# Patient Record
Sex: FEMALE | URBAN - METROPOLITAN AREA
[De-identification: names, ages, dates, MRNs, and addresses within clinical notes are randomized per-mention and may not be internally consistent; named-entity substitution may affect disease eponyms.]

---

## 2018-09-22 PROCEDURE — 87624 HPV HI-RISK TYP POOLED RSLT: CPT | Performed by: OBSTETRICS & GYNECOLOGY

## 2018-09-22 PROCEDURE — 88175 CYTOPATH C/V AUTO FLUID REDO: CPT | Performed by: OBSTETRICS & GYNECOLOGY

## 2018-09-22 PROCEDURE — 87591 N.GONORRHOEAE DNA AMP PROB: CPT | Performed by: OBSTETRICS & GYNECOLOGY

## 2018-09-22 PROCEDURE — 87491 CHLMYD TRACH DNA AMP PROBE: CPT | Performed by: OBSTETRICS & GYNECOLOGY

## 2022-01-05 ENCOUNTER — APPOINTMENT (OUTPATIENT)
Dept: URBAN - METROPOLITAN AREA CLINIC 321 | Age: 32
Setting detail: DERMATOLOGY
End: 2022-01-05

## 2022-01-05 DIAGNOSIS — L21.8 OTHER SEBORRHEIC DERMATITIS: ICD-10-CM

## 2022-01-05 DIAGNOSIS — L71.8 OTHER ROSACEA: ICD-10-CM

## 2022-01-05 PROCEDURE — OTHER PRESCRIPTION: OTHER

## 2022-01-05 PROCEDURE — OTHER COUNSELING: OTHER

## 2022-01-05 PROCEDURE — 99204 OFFICE O/P NEW MOD 45 MIN: CPT

## 2022-01-05 RX ORDER — KETOCONAZOLE 20 MG/G
CREAM TOPICAL
Qty: 30 | Refills: 3 | Status: ERX | COMMUNITY
Start: 2022-01-05

## 2022-01-05 RX ORDER — METRONIDAZOLE 7.5 MG/G
CREAM TOPICAL
Qty: 45 | Refills: 3 | Status: ERX | COMMUNITY
Start: 2022-01-05

## 2022-10-26 ENCOUNTER — OFFICE VISIT (OUTPATIENT)
Dept: OBGYN CLINIC | Facility: CLINIC | Age: 32
End: 2022-10-26
Payer: MEDICAID

## 2022-10-26 ENCOUNTER — LAB ENCOUNTER (OUTPATIENT)
Dept: LAB | Facility: HOSPITAL | Age: 32
End: 2022-10-26
Attending: ADVANCED PRACTICE MIDWIFE
Payer: MEDICAID

## 2022-10-26 VITALS
HEART RATE: 97 BPM | BODY MASS INDEX: 30.36 KG/M2 | WEIGHT: 165 LBS | SYSTOLIC BLOOD PRESSURE: 132 MMHG | DIASTOLIC BLOOD PRESSURE: 80 MMHG | HEIGHT: 62 IN

## 2022-10-26 DIAGNOSIS — Z11.3 SCREEN FOR STD (SEXUALLY TRANSMITTED DISEASE): ICD-10-CM

## 2022-10-26 DIAGNOSIS — Z31.9 PATIENT DESIRES PREGNANCY: ICD-10-CM

## 2022-10-26 DIAGNOSIS — Z01.419 ENCOUNTER FOR ANNUAL ROUTINE GYNECOLOGICAL EXAMINATION: Primary | ICD-10-CM

## 2022-10-26 DIAGNOSIS — Z32.00 PREGNANCY EXAMINATION OR TEST, PREGNANCY UNCONFIRMED: ICD-10-CM

## 2022-10-26 DIAGNOSIS — Z30.432 ENCOUNTER FOR IUD REMOVAL: ICD-10-CM

## 2022-10-26 LAB
CONTROL LINE PRESENT WITH A CLEAR BACKGROUND (YES/NO): YES YES/NO
HBV SURFACE AG SER-ACNC: <0.1 [IU]/L
HBV SURFACE AG SERPL QL IA: NONREACTIVE
HCV AB SERPL QL IA: NONREACTIVE
PREGNANCY TEST, URINE: NEGATIVE
RUBV IGG SER QL: POSITIVE
RUBV IGG SER-ACNC: 105.3 IU/ML (ref 10–?)

## 2022-10-26 PROCEDURE — 3075F SYST BP GE 130 - 139MM HG: CPT | Performed by: ADVANCED PRACTICE MIDWIFE

## 2022-10-26 PROCEDURE — 81025 URINE PREGNANCY TEST: CPT | Performed by: ADVANCED PRACTICE MIDWIFE

## 2022-10-26 PROCEDURE — 86803 HEPATITIS C AB TEST: CPT

## 2022-10-26 PROCEDURE — 87389 HIV-1 AG W/HIV-1&-2 AB AG IA: CPT

## 2022-10-26 PROCEDURE — 99385 PREV VISIT NEW AGE 18-39: CPT | Performed by: ADVANCED PRACTICE MIDWIFE

## 2022-10-26 PROCEDURE — 58301 REMOVE INTRAUTERINE DEVICE: CPT | Performed by: ADVANCED PRACTICE MIDWIFE

## 2022-10-26 PROCEDURE — 87340 HEPATITIS B SURFACE AG IA: CPT

## 2022-10-26 PROCEDURE — 3079F DIAST BP 80-89 MM HG: CPT | Performed by: ADVANCED PRACTICE MIDWIFE

## 2022-10-26 PROCEDURE — 86780 TREPONEMA PALLIDUM: CPT

## 2022-10-26 PROCEDURE — 3008F BODY MASS INDEX DOCD: CPT | Performed by: ADVANCED PRACTICE MIDWIFE

## 2022-10-26 PROCEDURE — 36415 COLL VENOUS BLD VENIPUNCTURE: CPT

## 2022-10-26 PROCEDURE — 86762 RUBELLA ANTIBODY: CPT

## 2022-10-27 LAB
C TRACH DNA SPEC QL NAA+PROBE: NEGATIVE
HPV I/H RISK 1 DNA SPEC QL NAA+PROBE: NEGATIVE
N GONORRHOEA DNA SPEC QL NAA+PROBE: NEGATIVE

## 2022-10-27 NOTE — PROCEDURES
IUD Removal     Pregnancy Results: negative from urine test   Birth control method(s) used:   IUD    Consent signed. Procedure discussed with the patient in detail including indication, risks, benefits, alternatives and complications. Pelvic Exam Findings:  See progress notes    Procedure:  Speculum placed in the vagina. Ring clamp used to grasp IUD strings. IUD was removed without difficulty. IUD appears complete and intact  Visualized by patient  The patient tolerated the procedure well. Visit Plan:  Discharge instructions were reviewed with the patient.

## 2022-10-28 ENCOUNTER — TELEPHONE (OUTPATIENT)
Dept: OBGYN CLINIC | Facility: CLINIC | Age: 32
End: 2022-10-28

## 2022-10-28 LAB — T PALLIDUM AB SER QL: NEGATIVE

## 2022-10-28 NOTE — TELEPHONE ENCOUNTER
----- Message from Amol Begum CNM sent at 10/27/2022  2:24 PM CDT -----  Please call HIV, HepB & Hep C are negative

## 2022-11-08 RX ORDER — METRONIDAZOLE 500 MG/1
500 TABLET ORAL 2 TIMES DAILY
Qty: 14 TABLET | Refills: 0 | Status: SHIPPED | OUTPATIENT
Start: 2022-11-08

## 2023-12-04 ENCOUNTER — OFFICE VISIT (OUTPATIENT)
Dept: DERMATOLOGY CLINIC | Facility: CLINIC | Age: 33
End: 2023-12-04
Payer: MEDICAID

## 2023-12-04 DIAGNOSIS — L30.9 DERMATITIS: Primary | ICD-10-CM

## 2023-12-04 DIAGNOSIS — L71.9 ROSACEA: ICD-10-CM

## 2023-12-04 PROCEDURE — 99203 OFFICE O/P NEW LOW 30 MIN: CPT | Performed by: DERMATOLOGY

## 2023-12-04 RX ORDER — IVERMECTIN 10 MG/G
CREAM TOPICAL
Qty: 30 G | Refills: 1 | Status: SHIPPED | OUTPATIENT
Start: 2023-12-04

## 2023-12-04 RX ORDER — AMMONIUM LACTATE 12 G/100G
1 LOTION TOPICAL 2 TIMES DAILY
Qty: 500 G | Refills: 11 | Status: SHIPPED | OUTPATIENT
Start: 2023-12-04

## 2023-12-04 RX ORDER — HYDROCORTISONE 25 MG/ML
LOTION TOPICAL
Qty: 120 ML | Refills: 1 | Status: SHIPPED | OUTPATIENT
Start: 2023-12-04

## 2023-12-04 RX ORDER — CLINDAMYCIN PHOSPHATE 10 UG/ML
1 LOTION TOPICAL 2 TIMES DAILY
Qty: 60 ML | Refills: 11 | Status: SHIPPED | OUTPATIENT
Start: 2023-12-04

## 2023-12-06 ENCOUNTER — TELEPHONE (OUTPATIENT)
Dept: DERMATOLOGY CLINIC | Facility: CLINIC | Age: 33
End: 2023-12-06

## 2023-12-06 NOTE — TELEPHONE ENCOUNTER
[facility] not [facility].     Placed fax in pa inbox    Please call [phone] id [phone]

## 2023-12-08 ENCOUNTER — HOSPITAL ENCOUNTER (EMERGENCY)
Facility: HOSPITAL | Age: 33
Discharge: HOME | End: 2023-12-08
Attending: EMERGENCY MEDICINE
Payer: MEDICAID

## 2023-12-08 VITALS
OXYGEN SATURATION: 98 % | TEMPERATURE: 100 F | DIASTOLIC BLOOD PRESSURE: 84 MMHG | SYSTOLIC BLOOD PRESSURE: 126 MMHG | HEART RATE: 108 BPM | RESPIRATION RATE: 20 BRPM

## 2023-12-08 DIAGNOSIS — R04.0 EPISTAXIS: ICD-10-CM

## 2023-12-08 DIAGNOSIS — J11.1 INFLUENZA: Primary | ICD-10-CM

## 2023-12-08 LAB
B-HCG UR QL: NEGATIVE
FLUAV + FLUBV RNA SPEC NAA+PROBE: NEGATIVE
FLUAV + FLUBV RNA SPEC NAA+PROBE: POSITIVE
RSV RNA SPEC NAA+PROBE: NEGATIVE
SARS-COV-2 RNA RESP QL NAA+PROBE: NOT DETECTED

## 2023-12-08 PROCEDURE — 99283 EMERGENCY DEPT VISIT LOW MDM: CPT

## 2023-12-08 PROCEDURE — 0241U SARS-COV-2/FLU A AND B/RSV BY PCR (GENEXPERT): CPT | Performed by: EMERGENCY MEDICINE

## 2023-12-08 PROCEDURE — 81025 URINE PREGNANCY TEST: CPT

## 2023-12-08 PROCEDURE — 0241U SARS-COV-2/FLU A AND B/RSV BY PCR (GENEXPERT): CPT

## 2023-12-08 RX ORDER — IBUPROFEN 600 MG/1
600 TABLET ORAL ONCE
Status: COMPLETED | OUTPATIENT
Start: 2023-12-08 | End: 2023-12-08

## 2023-12-09 NOTE — ED QUICK NOTES
Discharge instructions given to pt. Pt verbalized understanding of home care, fever control, and to follow up with PCP. Pt denied further questions or concerns. Pt ambulatory out of ED with mom, pt discharged in stable condition.

## 2023-12-11 NOTE — PROGRESS NOTES
Kalyani Hall is a 35year old female. HPI:     CC:    Chief Complaint   Patient presents with    Rash     New Patient presents with an isolated rash of concern on the Left cheek x 2 years. Area flares at times. Cluster of small lesions filled with with clear fluid. Area is itchy, red, and flaky. Pt has been applying castor oil, vasyl hip oil, coconut oil, and OTC moisturizer without improvement. Pt states that after popping the lesions, the severity of inflammation/redness decreased. Derm Problem     Pt presents with raised, acne-like lesions on the buttocks x several weeks. Pt denies use of any treatments or any symptoms to concern. Allergies:  Dander    HISTORY:    History reviewed. No pertinent past medical history. Past Surgical History:   Procedure Laterality Date    ANESTH,OPEN HEART SURGERY      COLPOSCOPY,BX CERVIX/ENDOCERV CURR  10/2018      Family History   Problem Relation Age of Onset    Diabetes Father     Hypertension Father     Renal Disease Father     Other (bladder cancer) Maternal Grandmother       Social History     Socioeconomic History    Marital status: Single   Tobacco Use    Smoking status: Never    Smokeless tobacco: Never   Vaping Use    Vaping Use: Never used   Substance and Sexual Activity    Alcohol use: Yes    Drug use: No    Sexual activity: Yes     Partners: Male     Birth control/protection: I.U.D. Other Topics Concern    Reaction to local anesthetic No    Pt has a pacemaker No    Pt has a defibrillator No        Current Outpatient Medications   Medication Sig Dispense Refill    clindamycin 1 % External Lotion Apply 1 Application topically 2 (two) times daily. Apply thin film to affected area(s). Acne like rash face and back/ thighs 60 mL 11    Hydrocortisone 2.5 % External Lotion Apply every day to rash on thighs, back. Do not use on face 120 mL 1    ammonium lactate 12 % External Lotion Apply 1 Application topically 2 (two) times daily.  To dry bumpy skin arms, legs, back 500 g 11    Ivermectin (SOOLANTRA) 1 % External Cream Apply to left cheek qhs 30 g 1     Allergies: Allergies   Allergen Reactions    Dander OTHER (SEE COMMENTS)     Runny nose, stuffy nose, red/ watery eyes       History reviewed. No pertinent past medical history. Past Surgical History:   Procedure Laterality Date    ANESTH,OPEN HEART SURGERY      COLPOSCOPY,BX CERVIX/ENDOCERV CURR  10/2018     Social History     Socioeconomic History    Marital status: Single     Spouse name: Not on file    Number of children: Not on file    Years of education: Not on file    Highest education level: Not on file   Occupational History    Not on file   Tobacco Use    Smoking status: Never    Smokeless tobacco: Never   Vaping Use    Vaping Use: Never used   Substance and Sexual Activity    Alcohol use: Yes    Drug use: No    Sexual activity: Yes     Partners: Male     Birth control/protection: I.U.D. Other Topics Concern    Grew up on a farm Not Asked    History of tanning Not Asked    Outdoor occupation Not Asked    Breast feeding Not Asked    Reaction to local anesthetic No    Pt has a pacemaker No    Pt has a defibrillator No   Social History Narrative    Not on file     Social Determinants of Health     Financial Resource Strain: Not on file   Food Insecurity: Not on file   Transportation Needs: Not on file   Physical Activity: Not on file   Stress: Not on file   Social Connections: Not on file   Housing Stability: Not on file     Family History   Problem Relation Age of Onset    Diabetes Father     Hypertension Father     Renal Disease Father     Other (bladder cancer) Maternal Grandmother        There were no vitals filed for this visit. HPI:  Chief Complaint   Patient presents with    Rash     New Patient presents with an isolated rash of concern on the Left cheek x 2 years. Area flares at times. Cluster of small lesions filled with with clear fluid. Area is itchy, red, and flaky.  Pt has been applying castor oil, vasyl hip oil, coconut oil, and OTC moisturizer without improvement. Pt states that after popping the lesions, the severity of inflammation/redness decreased. Derm Problem     Pt presents with raised, acne-like lesions on the buttocks x several weeks. Pt denies use of any treatments or any symptoms to concern. Patient with erythema small bumpy lesions not actual acne has tried to pop them without success has applied a variety of things as noted different oils moisturizers. Inflamed irritated. Has noted this has been associated with increased rest with her son's Crohn's disease. Patient has noted itchy areas on the back arms buttocks area    Unaware of any family history of skin problems no past problems  Otherwise patient's herself been pretty healthy  Recent labs reviewed noncontributory. No significant history of atopy  Patient presents with concerns above. Patient has been in their usual state of health. Past notes/ records and appropriate/relevant lab results including pathology and past body maps reviewed. Including outside notes/ PCP notes as appropriate. Updated and new information noted in current visit. ROS:  Denies other relevant systemic complaints. History, medications, allergies reviewed as noted. Physical Examination:     Well-developed well-nourished patient alert oriented in no acute distress. Exam performed, including scalp, head, neck, face,nails, hair, external eyes, including conjunctival mucosa, eyelids, lips external ears , arms, digits,palms. Multiple light to medium brown, well marginated, uniformly pigmented, macules and papules 6 mm and less scattered on exam. pigmented lesions examined with dermoscopy benign-appearing patterns. See map today's date for lesions noted . See assessment and plan below for specific lesions. Otherwise remarkable for lesions as noted on map.     See A/P  below for additional information:    Assessment / plan:    No orders of the defined types were placed in this encounter. Meds & Refills for this Visit:  Requested Prescriptions     Signed Prescriptions Disp Refills    clindamycin 1 % External Lotion 60 mL 11     Sig: Apply 1 Application topically 2 (two) times daily. Apply thin film to affected area(s). Acne like rash face and back/ thighs    Hydrocortisone 2.5 % External Lotion 120 mL 1     Sig: Apply every day to rash on thighs, back. Do not use on face    ammonium lactate 12 % External Lotion 500 g 11     Sig: Apply 1 Application topically 2 (two) times daily. To dry bumpy skin arms, legs, back    Ivermectin (SOOLANTRA) 1 % External Cream 30 g 1     Sig: Apply to left cheek qhs         Encounter Diagnoses   Name Primary? Dermatitis Yes    Rosacea      Erythematous papules clustered at left mid cheek  . Rosacea. Meds in grid. Skin care instructions reviewed. Pathophysiology reviewed. Chronic recurrent nature discussed. Patient will let us know how they are doing over the next several weeks. Await clinical response to above therapy. Differential includes Demodex folliculitis. Topicals as noted clindamycin twice daily, Soolantra daily for anti-inflammatory effect as well as Demodex therapy oral ivermectin  would be inappropriate at this point in time  Consider oral antibiotics if not improving    Eczematous patches arms buttocks posterior thighs, with follicular-based inflammatory papules  Dermatitis atopic  Secondary folliculitis  Clindamycin, hydrocortisone twice daily  More keratotic component add ammonium lactate    Background of Keratosis pilaris. Pathophysiology discussed. Chronic nature as underlying condition genetic discussed. Management of symptoms with itching irritation hydrocortisone, alphahydroxy acids such as glycolic, lactic or salicylic acid to smooth lesions. Reassurance given    No other susupicious lesions on todays  exam.    Please refer to map for specific lesions.   See additional diagnoses. Pros cons of various therapies, risks benefits discussed. Pathophysiology discussed with patient. Therapeutic options reviewed. See  Medications in grid. Instructions reviewed at length. Benign nevi,  Reassurance regarding other benign skin lesions. Signs and symptoms of skin cancer, ABCDE's of melanoma discussed with patient. Sunscreen use, sun protection, self exams reviewed. Followup as noted RTC ---routine checkup    6 mos -one year or p.r.n. Encounter Times   Including precharting, reviewing chart, prior notes obtaining history: 10 minutes, medical exam :10 minutes, notes on body map, plan, counseling 10minutes My total time spent caring for the patient on the day of the encounter: 30 minutes     The patient indicates understanding of these issues and agrees to the plan. The patient is asked to return as noted in follow-up/ above. This note was generated using Dragon voice recognition software. Please contact me regarding any confusion resulting from errors in recognition. .  Note to patient and family: The [facility] makes medical notes like these available to patients. However, be advised this is a medical document. It is intended as eeft-nj-ctst communication and monitoring of a patient's care needs. It is written in medical language and may contain abbreviations or verbiage that are unfamiliar. It may appear blunt or direct. Medical documents are intended to carry relevant information, facts as evident and the clinical opinion of the practitioner.

## 2023-12-12 NOTE — TELEPHONE ENCOUNTER
Medication PA Requested:  Ivermectin (SOOLANTRA) 1 % External Cream                                                           CoverMyMeds Used:  Key:  Quantity:  30 g  Day Supply:  Sig:  Apply to left cheek qhs   DX Code:  L 71.9                                   CPT code (if applicable):   Case Number/Pending Ref#:     Thank you!

## 2023-12-12 NOTE — TELEPHONE ENCOUNTER
Medication PA Requested:  Ivermectin (SOOLANTRA) 1 % External Cream                                                           CoverMyMeds Used:  Yes  HDN:[id number]  Quantity:  30 g  Day Supply:  Sig:  Apply to left cheek qhs   DX Code:  L 71.9                                   CPT code (if applicable):   Case Number/Pending Ref#:        CMM submitted, LOV 12/4  Awaiting determination

## 2023-12-13 NOTE — TELEPHONE ENCOUNTER
Fax from \[facility]\""    Denied for IVERMECTIN CRE !%    USE PREFERRED METRONIDAZOLE 1% GEL        Placed fax in pa inbox

## 2023-12-14 RX ORDER — METRONIDAZOLE 10 MG/G
GEL TOPICAL
Qty: 60 G | Refills: 1 | Status: SHIPPED | OUTPATIENT
Start: 2023-12-14

## 2023-12-14 NOTE — TELEPHONE ENCOUNTER
Noted, insurance mandates a trial of 1% metrogel first . Rx sent- if no better in 1 month will resend rx for ivermectin and proceed with new pa

## 2024-02-20 ENCOUNTER — LAB ENCOUNTER (OUTPATIENT)
Dept: LAB | Age: 34
End: 2024-02-20
Payer: MEDICAID

## 2024-02-20 ENCOUNTER — OFFICE VISIT (OUTPATIENT)
Dept: FAMILY MEDICINE CLINIC | Facility: CLINIC | Age: 34
End: 2024-02-20

## 2024-02-20 VITALS
RESPIRATION RATE: 16 BRPM | BODY MASS INDEX: 29.28 KG/M2 | WEIGHT: 159.13 LBS | SYSTOLIC BLOOD PRESSURE: 122 MMHG | HEART RATE: 79 BPM | OXYGEN SATURATION: 100 % | HEIGHT: 62 IN | DIASTOLIC BLOOD PRESSURE: 79 MMHG | TEMPERATURE: 98 F

## 2024-02-20 DIAGNOSIS — Z32.01 POSITIVE PREGNANCY TEST (HCC): ICD-10-CM

## 2024-02-20 DIAGNOSIS — N92.6 MISSED PERIOD: Primary | ICD-10-CM

## 2024-02-20 LAB
BASOPHILS # BLD AUTO: 0.05 X10(3) UL (ref 0–0.2)
BASOPHILS NFR BLD AUTO: 0.5 %
CONTROL LINE PRESENT WITH A CLEAR BACKGROUND (YES/NO): YES YES/NO
DEPRECATED RDW RBC AUTO: 45.1 FL (ref 35.1–46.3)
EOSINOPHIL # BLD AUTO: 0.26 X10(3) UL (ref 0–0.7)
EOSINOPHIL NFR BLD AUTO: 2.6 %
ERYTHROCYTE [DISTWIDTH] IN BLOOD BY AUTOMATED COUNT: 13 % (ref 11–15)
HCT VFR BLD AUTO: 36.8 %
HGB BLD-MCNC: 12.5 G/DL
IMM GRANULOCYTES # BLD AUTO: 0.02 X10(3) UL (ref 0–1)
IMM GRANULOCYTES NFR BLD: 0.2 %
KIT LOT #: NORMAL NUMERIC
LYMPHOCYTES # BLD AUTO: 3.09 X10(3) UL (ref 1–4)
LYMPHOCYTES NFR BLD AUTO: 30.8 %
MCH RBC QN AUTO: 32.2 PG (ref 26–34)
MCHC RBC AUTO-ENTMCNC: 34 G/DL (ref 31–37)
MCV RBC AUTO: 94.8 FL
MONOCYTES # BLD AUTO: 0.58 X10(3) UL (ref 0.1–1)
MONOCYTES NFR BLD AUTO: 5.8 %
NEUTROPHILS # BLD AUTO: 6.02 X10 (3) UL (ref 1.5–7.7)
NEUTROPHILS # BLD AUTO: 6.02 X10(3) UL (ref 1.5–7.7)
NEUTROPHILS NFR BLD AUTO: 60.1 %
PLATELET # BLD AUTO: 306 10(3)UL (ref 150–450)
PREGNANCY TEST, URINE: POSITIVE
RBC # BLD AUTO: 3.88 X10(6)UL
WBC # BLD AUTO: 10 X10(3) UL (ref 4–11)

## 2024-02-20 PROCEDURE — 36415 COLL VENOUS BLD VENIPUNCTURE: CPT

## 2024-02-20 PROCEDURE — 83036 HEMOGLOBIN GLYCOSYLATED A1C: CPT

## 2024-02-20 PROCEDURE — 80053 COMPREHEN METABOLIC PANEL: CPT

## 2024-02-20 PROCEDURE — 81025 URINE PREGNANCY TEST: CPT

## 2024-02-20 PROCEDURE — 84443 ASSAY THYROID STIM HORMONE: CPT

## 2024-02-20 PROCEDURE — 84702 CHORIONIC GONADOTROPIN TEST: CPT

## 2024-02-20 PROCEDURE — 85025 COMPLETE CBC W/AUTO DIFF WBC: CPT

## 2024-02-20 PROCEDURE — 99203 OFFICE O/P NEW LOW 30 MIN: CPT

## 2024-02-20 NOTE — PROGRESS NOTES
HPI:    Patient ID: Kailyn Vieyra is a 33 year old female.    HPI     Patient here in office concerned for pregnancy, MP: 1/15/24.  Interested in urine pregnancy test.         Review of Systems   Constitutional: Negative.    Respiratory: Negative.     Cardiovascular: Negative.    Genitourinary:  Positive for menstrual problem.   Skin: Negative.    Neurological: Negative.    Psychiatric/Behavioral: Negative.              Current Outpatient Medications   Medication Sig Dispense Refill    metroNIDAZOLE (METROGEL) 1 % External Gel Use bid to rash on face 60 g 1    clindamycin 1 % External Lotion Apply 1 Application topically 2 (two) times daily. Apply thin film to affected area(s). Acne like rash face and back/ thighs 60 mL 11    Hydrocortisone 2.5 % External Lotion Apply every day to rash on thighs, back. Do not use on face 120 mL 1    ammonium lactate 12 % External Lotion Apply 1 Application topically 2 (two) times daily. To dry bumpy skin arms, legs, back 500 g 11     Allergies:  Allergies   Allergen Reactions    Dander OTHER (SEE COMMENTS)     Runny nose, stuffy nose, red/ watery eyes      /79   Pulse 79   Temp 97.6 °F (36.4 °C) (Temporal)   Resp 16   Ht 5' 2\" (1.575 m)   Wt 159 lb 2 oz (72.2 kg)   LMP 01/15/2024 (Exact Date)   SpO2 100%   BMI 29.10 kg/m²   Body mass index is 29.1 kg/m².  PHYSICAL EXAM:   Physical Exam  Vitals reviewed.   Constitutional:       General: She is not in acute distress.     Appearance: Normal appearance. She is not ill-appearing.   Cardiovascular:      Rate and Rhythm: Normal rate and regular rhythm.      Heart sounds: Normal heart sounds. No murmur heard.     No friction rub. No gallop.   Pulmonary:      Effort: Pulmonary effort is normal. No respiratory distress.      Breath sounds: Normal breath sounds. No stridor. No wheezing, rhonchi or rales.   Chest:      Chest wall: No tenderness.   Skin:     General: Skin is warm.      Findings: No rash.   Neurological:       General: No focal deficit present.      Mental Status: She is alert and oriented to person, place, and time.   Psychiatric:         Mood and Affect: Mood normal.         Behavior: Behavior normal.         Thought Content: Thought content normal.         Judgment: Judgment normal.                ASSESSMENT/PLAN:   1. Missed period  - Urine pregnancy test positive   - POC Urine pregnancy test [61057]    2. Positive pregnancy test (HCC)  - CBC With Differential With Platelet; Future  - Comp Metabolic Panel (14); Future  - TSH W Reflex To Free T4 [E]; Future  - Hemoglobin A1C [E]; Future  - HCG, Beta Subunit, Quant [E]; Future  - OBG Referral - In Network      Orders Placed This Encounter   Procedures    POC Urine pregnancy test [52591]    CBC With Differential With Platelet    Comp Metabolic Panel (14)    TSH W Reflex To Free T4 [E]    Hemoglobin A1C [E]    HCG, Beta Subunit, Quant [E]       Meds This Visit:  Requested Prescriptions      No prescriptions requested or ordered in this encounter       Imaging & Referrals:  OBG - INTERNAL         ID#[id number]

## 2024-02-21 LAB
ALBUMIN SERPL-MCNC: 4.4 G/DL (ref 3.2–4.8)
ALBUMIN/GLOB SERPL: 1.5 {RATIO} (ref 1–2)
ALP LIVER SERPL-CCNC: 54 U/L
ALT SERPL-CCNC: 15 U/L
ANION GAP SERPL CALC-SCNC: 7 MMOL/L (ref 0–18)
AST SERPL-CCNC: 16 U/L (ref ?–34)
B-HCG SERPL-ACNC: 3044.1 MIU/ML
BILIRUB SERPL-MCNC: 0.4 MG/DL (ref 0.3–1.2)
BUN BLD-MCNC: 11 MG/DL (ref 9–23)
BUN/CREAT SERPL: 14.7 (ref 10–20)
CALCIUM BLD-MCNC: 8.9 MG/DL (ref 8.7–10.4)
CHLORIDE SERPL-SCNC: 108 MMOL/L (ref 98–112)
CO2 SERPL-SCNC: 23 MMOL/L (ref 21–32)
CREAT BLD-MCNC: 0.75 MG/DL
EGFRCR SERPLBLD CKD-EPI 2021: 108 ML/MIN/1.73M2 (ref 60–?)
EST. AVERAGE GLUCOSE BLD GHB EST-MCNC: 103 MG/DL (ref 68–126)
FASTING STATUS PATIENT QL REPORTED: NO
GLOBULIN PLAS-MCNC: 2.9 G/DL (ref 2.8–4.4)
GLUCOSE BLD-MCNC: 84 MG/DL (ref 70–99)
HBA1C MFR BLD: 5.2 % (ref ?–5.7)
OSMOLALITY SERPL CALC.SUM OF ELEC: 285 MOSM/KG (ref 275–295)
POTASSIUM SERPL-SCNC: 3.8 MMOL/L (ref 3.5–5.1)
PROT SERPL-MCNC: 7.3 G/DL (ref 5.7–8.2)
SODIUM SERPL-SCNC: 138 MMOL/L (ref 136–145)
TSI SER-ACNC: 2.44 MIU/ML (ref 0.55–4.78)

## 2024-03-01 ENCOUNTER — LAB ENCOUNTER (OUTPATIENT)
Dept: LAB | Age: 34
End: 2024-03-01
Attending: STUDENT IN AN ORGANIZED HEALTH CARE EDUCATION/TRAINING PROGRAM
Payer: MEDICAID

## 2024-03-01 ENCOUNTER — OFFICE VISIT (OUTPATIENT)
Dept: FAMILY MEDICINE CLINIC | Facility: CLINIC | Age: 34
End: 2024-03-01
Payer: MEDICAID

## 2024-03-01 VITALS
OXYGEN SATURATION: 100 % | WEIGHT: 160.63 LBS | HEIGHT: 62 IN | DIASTOLIC BLOOD PRESSURE: 47 MMHG | BODY MASS INDEX: 29.56 KG/M2 | HEART RATE: 68 BPM | TEMPERATURE: 98 F | SYSTOLIC BLOOD PRESSURE: 101 MMHG

## 2024-03-01 DIAGNOSIS — R11.0 NAUSEA: ICD-10-CM

## 2024-03-01 DIAGNOSIS — L65.9 FALLING HAIR: ICD-10-CM

## 2024-03-01 DIAGNOSIS — L91.0 KELOID: ICD-10-CM

## 2024-03-01 DIAGNOSIS — Z3A.01 LESS THAN 8 WEEKS GESTATION OF PREGNANCY (HCC): ICD-10-CM

## 2024-03-01 DIAGNOSIS — Z00.00 WELL ADULT EXAM: Primary | ICD-10-CM

## 2024-03-01 DIAGNOSIS — Z98.890 HISTORY OF HEART SURGERY: ICD-10-CM

## 2024-03-01 LAB
DEPRECATED HBV CORE AB SER IA-ACNC: 85.5 NG/ML
IRON SATN MFR SERPL: 23 %
IRON SERPL-MCNC: 82 UG/DL
TIBC SERPL-MCNC: 358 UG/DL (ref 250–425)
TRANSFERRIN SERPL-MCNC: 240 MG/DL (ref 250–380)
VIT D+METAB SERPL-MCNC: 23.4 NG/ML (ref 30–100)

## 2024-03-01 PROCEDURE — 82306 VITAMIN D 25 HYDROXY: CPT

## 2024-03-01 PROCEDURE — 36415 COLL VENOUS BLD VENIPUNCTURE: CPT

## 2024-03-01 PROCEDURE — 84466 ASSAY OF TRANSFERRIN: CPT

## 2024-03-01 PROCEDURE — 86225 DNA ANTIBODY NATIVE: CPT

## 2024-03-01 PROCEDURE — 86038 ANTINUCLEAR ANTIBODIES: CPT

## 2024-03-01 PROCEDURE — 82728 ASSAY OF FERRITIN: CPT

## 2024-03-01 PROCEDURE — 83540 ASSAY OF IRON: CPT

## 2024-03-01 PROCEDURE — 99395 PREV VISIT EST AGE 18-39: CPT | Performed by: STUDENT IN AN ORGANIZED HEALTH CARE EDUCATION/TRAINING PROGRAM

## 2024-03-01 RX ORDER — ONDANSETRON HYDROCHLORIDE 8 MG/1
8 TABLET, FILM COATED ORAL EVERY 8 HOURS PRN
Qty: 30 TABLET | Refills: 3 | Status: SHIPPED | OUTPATIENT
Start: 2024-03-01

## 2024-03-01 NOTE — PROGRESS NOTES
HPI:    Patient ID: Kailyn Vieyra is a 33 year old female.    HPI  Pt presenting for routine physical exam. No significant chronic medical problems. Past medical/surgical history, family history, and social history were reviewed.     LMP 1/15/24  Endorses fatigue, nausea with vomiting  Taking prenatal MVI  Mild lower abd cramping  Denies vaginal bleeding    H/o prior heart surgery   Denies any chest pain, palpitations, dyspnea    H/o bump on ear    Mood stable, denies SH/SI/HI    Review of Systems   A comprehensive 10 point review of systems was completed.  Pertinent positives and negatives noted in the the HPI.       Current Outpatient Medications   Medication Sig Dispense Refill    ondansetron (ZOFRAN) 8 MG tablet Take 1 tablet (8 mg total) by mouth every 8 (eight) hours as needed for Nausea. 30 tablet 3     Allergies:  Allergies   Allergen Reactions    Dander OTHER (SEE COMMENTS)     Runny nose, stuffy nose, red/ watery eyes      Vitals:    03/01/24 1150   BP: 101/47   Pulse: 68   Temp: 98.1 °F (36.7 °C)   TempSrc: Temporal   SpO2: 100%   Weight: 160 lb 9.6 oz (72.8 kg)   Height: 5' 2\" (1.575 m)       Body mass index is 29.37 kg/m².   PHYSICAL EXAM:   Physical Exam  Vitals reviewed.   Constitutional:       General: She is not in acute distress.     Appearance: Normal appearance. She is well-developed.   HENT:      Head: Normocephalic and atraumatic.      Right Ear: Tympanic membrane, ear canal and external ear normal.      Left Ear: Tympanic membrane, ear canal and external ear normal.   Eyes:      Conjunctiva/sclera: Conjunctivae normal.   Neck:      Thyroid: No thyroid mass or thyroid tenderness.   Cardiovascular:      Rate and Rhythm: Normal rate and regular rhythm.      Pulses: Normal pulses.      Heart sounds: Normal heart sounds, S1 normal and S2 normal. No murmur heard.  Pulmonary:      Effort: Pulmonary effort is normal. No respiratory distress.      Breath sounds: Normal breath sounds. No wheezing,  rhonchi or rales.   Abdominal:      General: Bowel sounds are normal.      Palpations: Abdomen is soft.      Tenderness: There is no abdominal tenderness. There is no guarding or rebound.   Musculoskeletal:      Cervical back: Normal range of motion and neck supple. No muscular tenderness.      Right lower leg: No edema.      Left lower leg: No edema.   Lymphadenopathy:      Cervical: No cervical adenopathy.   Skin:     General: Skin is warm and dry.      Coloration: Skin is not jaundiced.   Neurological:      General: No focal deficit present.      Mental Status: She is alert and oriented to person, place, and time. Mental status is at baseline.   Psychiatric:         Attention and Perception: Attention normal.         Mood and Affect: Mood normal.         Behavior: Behavior normal. Behavior is cooperative.         Cognition and Memory: Cognition normal.             ASSESSMENT/PLAN:   1. Well adult exam  Discussed preventative screenings  - Pap 10/2022  - recent labs reviewed  - encouraged to continue diet/exercise for overall wellness  - follow-up with eye doctor annually  - follow-up with dentist every 6 months  - return yearly for physicals  - annual flu shot  - tetanus booster every 10yrs -- will postpone until during pregnancy    2. Keloid  Follow-up with ENT for possible removal  - ENT Referral - [place] ([facility])    3. Falling hair  Will check labs  Continue to monitor  - Ferritin; Future  - Iron And Tibc [E]; Future  - Vitamin D [E]; Future  - Connective Tissue Disease (ALO) Screen, Reflex Specific Antibody; Future    4. History of heart surgery  Discussed role of baseline TTE  - discussed red flags for urgent reevaluation  - to call with any questions/concerns  - CARD ECHO 2D W DOPPLER/BUBBLES (CPT=93306); Future    5. Less than 8 weeks gestation of pregnancy (HCC)  Discussed OB intake scheduling  - encouraged to continue prenatal MVI  - increase hydration and rest as tolerated  - provided with  information re: healthy diet, activities to avoid, medications safe for pregnancy  - discussed red flags for urgent reevaluation  - to call with any questions/concerns  - CARD ECHO 2D W DOPPLER/BUBBLES (CPT=28023); Future    6. Nausea  - encouraged small, frequent meals  - Zofran PRN  - ondansetron (ZOFRAN) 8 MG tablet; Take 1 tablet (8 mg total) by mouth every 8 (eight) hours as needed for Nausea.  Dispense: 30 tablet; Refill: 3    Pt verbalized understanding and agrees with plan.    Orders Placed This Encounter   Procedures    Ferritin    Iron And Tibc [E]    Vitamin D [E]    Connective Tissue Disease (ALO) Screen, Reflex Specific Antibody       Meds This Visit:  Requested Prescriptions     Signed Prescriptions Disp Refills    ondansetron (ZOFRAN) 8 MG tablet 30 tablet 3     Sig: Take 1 tablet (8 mg total) by mouth every 8 (eight) hours as needed for Nausea.       Imaging & Referrals:  ENT - INTERNAL  CARD ECHO 2D W DOPPLER/BUBBLES (CPT=93306)         ID#[id number]

## 2024-03-04 LAB
DSDNA IGG SERPL IA-ACNC: 0.8 IU/ML
ENA AB SER QL IA: 0.4 UG/L
ENA AB SER QL IA: NEGATIVE

## 2024-03-05 ENCOUNTER — OFFICE VISIT (OUTPATIENT)
Dept: OBGYN CLINIC | Facility: CLINIC | Age: 34
End: 2024-03-05
Payer: MEDICAID

## 2024-03-05 VITALS
DIASTOLIC BLOOD PRESSURE: 78 MMHG | WEIGHT: 160.38 LBS | BODY MASS INDEX: 29.51 KG/M2 | HEIGHT: 62 IN | SYSTOLIC BLOOD PRESSURE: 124 MMHG

## 2024-03-05 DIAGNOSIS — N91.1 SECONDARY AMENORRHEA: ICD-10-CM

## 2024-03-05 DIAGNOSIS — Z32.01 PREGNANCY EXAMINATION OR TEST, POSITIVE RESULT (HCC): Primary | ICD-10-CM

## 2024-03-05 LAB
CONTROL LINE PRESENT WITH A CLEAR BACKGROUND (YES/NO): YES YES/NO
KIT LOT #: NORMAL NUMERIC
PREGNANCY TEST, URINE: POSITIVE

## 2024-03-05 PROCEDURE — 81025 URINE PREGNANCY TEST: CPT | Performed by: OBSTETRICS & GYNECOLOGY

## 2024-03-05 PROCEDURE — 99204 OFFICE O/P NEW MOD 45 MIN: CPT | Performed by: OBSTETRICS & GYNECOLOGY

## 2024-03-05 PROCEDURE — 76801 OB US < 14 WKS SINGLE FETUS: CPT | Performed by: OBSTETRICS & GYNECOLOGY

## 2024-03-05 NOTE — H&P
[facility]  Obstetrics and Gynecology    Subjective:     Kailyn Vieyra is a 33 year old  female presents with c/o secondary amenorrhea and positive pregnancy test. The patient was recommended to return for further evaluation. The patient reports this is a planned and desired pregnancy. Noted irregular cramping. No bleeding. Noted significant nausea and vomiting. Able to tolerate PO intake daily.       Patient's last menstrual period was 01/15/2024 (exact date)..     Review of Systems:  General: no complaints  Eyes: no complaints  Respiratory: no complaints  Cardiovascular: no complaints  GI: See HPI   : See HPI  Hematological/lymphatic: no complaints  Breast: no complaints  Psychiatric: no complaints  Endocrine:no complaints  Neurological: no complaints  Immunological: no complaints  Musculoskeletal:no complaints    Past Medical History:   Diagnosis Date    Allergic rhinitis     Rosacea        Past Surgical History:   Procedure Laterality Date    Anesth,open heart surgery      8yo for heart murmur for PFO?    Colposcopy,bx cervix/endocerv curr  10/2018      2013       Social History     Socioeconomic History    Marital status: Single     Spouse name: Not on file    Number of children: Not on file    Years of education: Not on file    Highest education level: Not on file   Occupational History    Not on file   Tobacco Use    Smoking status: Never    Smokeless tobacco: Never   Vaping Use    Vaping Use: Never used   Substance and Sexual Activity    Alcohol use: Not Currently    Drug use: No    Sexual activity: Yes     Partners: Male     Birth control/protection: I.U.D.   Other Topics Concern    Grew up on a farm Not Asked    History of tanning Not Asked    Outdoor occupation Not Asked    Breast feeding Not Asked    Reaction to local anesthetic No    Pt has a pacemaker No    Pt has a defibrillator No   Social History Narrative    Not on file     Social Determinants of Health      Financial Resource Strain: Not on file   Food Insecurity: Not on file   Transportation Needs: Not on file   Stress: Not on file   Housing Stability: Not on file       Family History   Problem Relation Age of Onset    Diabetes Father         69    Hypertension Father     Renal Disease Father     Other (bladder cancer) Maternal Grandmother     Cancer Maternal Grandmother          Objective:     Vitals:    24 1144   BP: 124/78   Weight: 160 lb 6.4 oz (72.8 kg)   Height: 62\"         Body mass index is 29.34 kg/m².    Exam:   GENERAL: well developed, well nourished, in no apparent distress, alert and orientated X 3  PSYCH: mood and affect stable   SKIN: no rashes, no lesions  LUNGS: respiration unlabored  CARDIOVASCULAR: no peripheral edema or varicosities, skin warm and dry  ABDOMEN: Soft, non distended; non tender.   EXTREMITIES:  Normal range of motion, strength 5/5 walking    Labs:  POC urine pregnancy test 24:   Positive     Imaging:  Pelvic ultrasound 24  Findings:  Crown-rump length measures 0.88 cm, 6 weeks 6 days.  Yolk sac present and normal appearing.   Fetal heart rate measures 142 beats per minute via M-mode.  Ultrasound estimated date of delivery 10/19/2024.       Assessment:     Kailyn Vieyra is a 33 year old  female who presents for secondary amenorrhea and positive pregnancy test    There is no problem list on file for this patient.        Plan:     Secondary amenorrhea  - a/w positive pregnancy test  - US noted for viable IUP at 6w6d. MARYSOL by last menstrual period consistent with 1st trimester ultrasound.    - HCG reviewed. No need for repeat given normal ultrasound.    - Type and screen to be done with routine prenatal labs ordered with RN education visit.   - CBC reviewed as normal 2024.   - RN education after 10 weeks. New OB 1-2 weeks later.     Patient counseling via instructions   - Pt counseled on safety, diet, exercise, caffiene, tobacco, ETOH, sexual activity, ER  precautions, diet, exercise, appropriate otc medication use, substance abuse   - Counseled on taking a PNV with 0.4mg of folic acid    - advised to follow up to establish prenatal care   - SAB precautions reviewed.    - Nausea and vomiting in pregnancy including vitamin B 6 and unisom reviewed in after visit instructions.     All of the findings and plan were discussed with the patient.  She notes understanding and agrees with the plan of care.  All questions were answered to the best of my ability at this time.    RTC in 3-4 weeks or sooner if needed     Dr. Mathieu Mercedes MD    [facility]     This note was created by Dragon voice recognition. Errors in content may be related to improper recognition by the system; efforts to review and correct have been done but errors may still exist. Please be advised the primary purpose of this note is for me to communicate medical care. Standard sentence structure is not always used. Medical terminology and medical abbreviations may be used. There may be grammatical, typographical, and automated fill ins that may have errors missed in proofreading.

## 2024-03-05 NOTE — PATIENT INSTRUCTIONS
Adapting to Pregnancy: First Trimester    As your body adjusts, you may have to change or limit your daily activities. You’ll need more rest. You may also need to use the energy you have more wisely.  Your changing body  Almost every part of your body is affected as you adapt to pregnancy. The uterus and cervix will begin to soften right away. You may not look very pregnant during the first 3 months. But you are likely to have some common signs of early pregnancy:  Nausea  Fatigue  Frequent urination  Mood swings  Bloating of the belly  Constipation  Heartburn  Missed or light periods (first trimester bleeding)  Nipple or breast tenderness and breast swelling  It’s not too late to start good habits  What matters most is protecting your baby from this moment on. If you smoke, drink alcohol, or use drugs, now is the time to stop. If you need help, talk with your healthcare provider:  Smoking increases the risk of stillbirth or having a low-birth-weight baby. If you smoke, quit now.  Alcohol and drugs have been linked with miscarriage, birth defects, intellectual disability, and low birth weight. Do not drink alcohol or take drugs.  Tips to relieve nausea  Although nausea can happen at any time of the day, it may be worse in the morning. To help prevent nausea:  Eat small, light meals at frequent intervals.  Drink fluids often.  Get up slowly. Eat a few unsalted crackers before you get out of bed.  Avoid smells that bother you.  Avoid spicy and fatty foods.  Eat an ice pop in your favorite flavor.  Get plenty of rest.  You can start taking juan francisco or mint in all forms or vitamin B6 (maximum 200 mg throughout the day) and Unisom (12.5-25 mg) nightly for nausea and/or vomiting.  Talk with your healthcare provider if you take vitamins that upset your stomach.    Work concerns  The end of the first trimester is a good time to discuss working during pregnancy with your employer. Follow your healthcare provider’s advice if  your job needs you to stand for a long time, work with hazardous tools, or even sit at a desk all day. Your workspace, workload, or scheduled hours may need to be adjusted. Perhaps you can change body postures more often or take an extra break. It is also acceptable to work throughout your pregnancy until delivery.   Advice for travel  Talk to your healthcare provider first, but the second trimester may be the best time for any travel. You may be advised to avoid certain trips while you’re pregnant. Food and water can be concerns in developing countries. Travel by car is a good choice, as you can stop, get out, and stretch. Bring snacks and water along. Fasten the lap belt below your belly, low over your hips. Also be sure to wear the shoulder harness.  Intimacy  Unless your healthcare provider tells you to, there is no reason to stop having sex while you’re pregnant. You or your partner may notice changes in desire. Desire may be less in the first trimester, due to nausea and fatigue. In the second trimester, sex may be very enjoyable. The third trimester can be a challenge comfort-wise. Try different positions and see what’s best for you both.  [employer] last reviewed this educational content on 10/1/2017  © 7861-8528 [employer]. [address], [place] [address]. All rights reserved. This information is not intended as a substitute for professional medical care. Always follow your healthcare professional's instructions.

## 2024-03-08 ENCOUNTER — OFFICE VISIT (OUTPATIENT)
Dept: OTOLARYNGOLOGY | Facility: CLINIC | Age: 34
End: 2024-03-08

## 2024-03-08 DIAGNOSIS — J34.2 DEVIATED NASAL SEPTUM: Primary | ICD-10-CM

## 2024-03-08 DIAGNOSIS — L91.0 KELOID: ICD-10-CM

## 2024-03-08 PROCEDURE — 99203 OFFICE O/P NEW LOW 30 MIN: CPT | Performed by: OTOLARYNGOLOGY

## 2024-03-08 NOTE — PROGRESS NOTES
Kailyn Vieyra is a 33 year old female.    Chief Complaint   Patient presents with    Ear Problem     Patient is here due to keloid on right ear x 1 year.     Sinus Problem     Reports congestion.        HISTORY OF PRESENT ILLNESS  She presents with a 1 year history of a right posterior earlobe keloid.  States that she had a piercing at that site remove the piercing and developed a keloid subsequent to that.  It has been growing over the last several months.  Also complains of chronic nasal obstruction most of her life.  States that she has been a chronic mouth breather perhaps slightly worse during her current pregnancy of 2 months.      Social History     Socioeconomic History    Marital status: Single   Tobacco Use    Smoking status: Never    Smokeless tobacco: Never   Vaping Use    Vaping Use: Never used   Substance and Sexual Activity    Alcohol use: Not Currently    Drug use: No    Sexual activity: Yes     Partners: Male     Birth control/protection: I.U.D.   Other Topics Concern    Reaction to local anesthetic No    Pt has a pacemaker No    Pt has a defibrillator No       Family History   Problem Relation Age of Onset    Diabetes Father         69    Hypertension Father     Renal Disease Father     Other (bladder cancer) Maternal Grandmother     Cancer Maternal Grandmother        Past Medical History:   Diagnosis Date    Allergic rhinitis     Rosacea        Past Surgical History:   Procedure Laterality Date    ANESTH,OPEN HEART SURGERY      6yo for heart murmur for PFO?    COLPOSCOPY,BX CERVIX/ENDOCERV CURR  10/2018      2013         REVIEW OF SYSTEMS    System Neg/Pos Details   Constitutional Negative Fatigue, fever and weight loss.   ENMT Negative Drooling.   Eyes Negative Blurred vision and vision changes.   Respiratory Negative Dyspnea and wheezing.   Cardio Negative Chest pain, irregular heartbeat/palpitations and syncope.   GI Negative Abdominal pain and diarrhea.   Endocrine Negative Cold  intolerance and heat intolerance.   Neuro Negative Tremors.   Psych Negative Anxiety and depression.   Integumentary Negative Frequent skin infections, pigment change and rash.   Hema/Lymph Negative Easy bleeding and easy bruising.           PHYSICAL EXAM    LMP 01/15/2024 (Exact Date)        Constitutional Normal Overall appearance - Normal.   Psychiatric Normal Orientation - Oriented to time, place, person & situation. Appropriate mood and affect.   Neck Exam Normal Inspection - Normal. Palpation - Normal. Parotid gland - Normal. Thyroid gland - Normal.   Eyes Normal Conjunctiva - Right: Normal, Left: Normal. Pupil - Right: Normal, Left: Normal. Fundus - Right: Normal, Left: Normal.   Neurological Normal Memory - Normal. Cranial nerves - Cranial nerves II through XII grossly intact.   Head/Face Normal Facial features - Normal. Eyebrows - Normal. Skull - Normal.        Nasopharynx Normal External nose - Normal. Lips/teeth/gums - Normal. Tonsils - Normal. Oropharynx - Normal.   Ears Normal Inspection - Right: 5 cm posterior earlobe keloid left: Normal. Canal - Right: Normal, Left: Normal. TM - Right: Normal, Left: Normal.   Skin Normal Inspection - Normal.        Lymph Detail Normal Submental. Submandibular. Anterior cervical. Posterior cervical. Supraclavicular.        Nose/Mouth/Throat Normal External nose - Normal. Lips/teeth/gums - Normal. Tonsils - Normal. Oropharynx - Normal.   Nose/Mouth/Throat Normal Nares - Right: Normal Left: Normal. Septum deviated to the right turbinates - Right: Normal, Left: Normal.       Current Outpatient Medications:     ondansetron (ZOFRAN) 8 MG tablet, Take 1 tablet (8 mg total) by mouth every 8 (eight) hours as needed for Nausea., Disp: 30 tablet, Rfl: 3  ASSESSMENT AND PLAN    1. Deviated nasal septum    2. Keloid  1.25 cm keloid of the posterior earlobe on the right.  Deviated septum to the right.  Currently 2 months pregnant.  I did recommend waiting until she delivers her  baby to address these issues concurrently.  She will simply return to see me in about 9 to 10 months.        This note was prepared using Dragon Medical voice recognition dictation software. As a result errors may occur. When identified these errors have been corrected. While every attempt is made to correct errors during dictation discrepancies may still exist    Iron Rojas MD    3/8/2024    11:57 AM

## 2024-03-20 ENCOUNTER — TELEPHONE (OUTPATIENT)
Dept: OBGYN CLINIC | Facility: CLINIC | Age: 34
End: 2024-03-20

## 2024-03-20 NOTE — TELEPHONE ENCOUNTER
Spoke with patient who called answering service. States that had discharge - borwn. Denies bright red bleeding, pain. Had ultrasound for viability previously. Warning signs review. Consider repeat ultrasound if bleeding is red or heavier.

## 2024-04-05 ENCOUNTER — PATIENT MESSAGE (OUTPATIENT)
Dept: OBGYN CLINIC | Facility: CLINIC | Age: 34
End: 2024-04-05

## 2024-04-05 DIAGNOSIS — Z34.81 ENCOUNTER FOR SUPERVISION OF OTHER NORMAL PREGNANCY IN FIRST TRIMESTER (HCC): Primary | ICD-10-CM

## 2024-04-09 ENCOUNTER — LAB ENCOUNTER (OUTPATIENT)
Dept: LAB | Facility: REFERENCE LAB | Age: 34
End: 2024-04-09
Attending: OBSTETRICS & GYNECOLOGY
Payer: MEDICAID

## 2024-04-09 ENCOUNTER — INITIAL PRENATAL (OUTPATIENT)
Dept: OBGYN CLINIC | Facility: CLINIC | Age: 34
End: 2024-04-09
Payer: MEDICAID

## 2024-04-09 ENCOUNTER — NURSE ONLY (OUTPATIENT)
Dept: OBGYN CLINIC | Facility: CLINIC | Age: 34
End: 2024-04-09
Payer: MEDICAID

## 2024-04-09 VITALS
DIASTOLIC BLOOD PRESSURE: 70 MMHG | BODY MASS INDEX: 30.22 KG/M2 | WEIGHT: 164.19 LBS | HEIGHT: 62 IN | SYSTOLIC BLOOD PRESSURE: 106 MMHG

## 2024-04-09 DIAGNOSIS — Z34.81 ENCOUNTER FOR SUPERVISION OF OTHER NORMAL PREGNANCY IN FIRST TRIMESTER (HCC): ICD-10-CM

## 2024-04-09 DIAGNOSIS — Z34.81 ENCOUNTER FOR SUPERVISION OF OTHER NORMAL PREGNANCY IN FIRST TRIMESTER (HCC): Primary | ICD-10-CM

## 2024-04-09 LAB
ANTIBODY SCREEN: NEGATIVE
BASOPHILS # BLD AUTO: 0.03 X10(3) UL (ref 0–0.2)
BASOPHILS NFR BLD AUTO: 0.3 %
DEPRECATED RDW RBC AUTO: 43.8 FL (ref 35.1–46.3)
EOSINOPHIL # BLD AUTO: 0.11 X10(3) UL (ref 0–0.7)
EOSINOPHIL NFR BLD AUTO: 1.2 %
ERYTHROCYTE [DISTWIDTH] IN BLOOD BY AUTOMATED COUNT: 12.7 % (ref 11–15)
GLUCOSE 1H P GLC SERPL-MCNC: 138 MG/DL
HBV SURFACE AG SER-ACNC: <0.1 [IU]/L
HBV SURFACE AG SERPL QL IA: NONREACTIVE
HCT VFR BLD AUTO: 34.1 %
HCV AB SERPL QL IA: NONREACTIVE
HGB BLD-MCNC: 11.7 G/DL
IMM GRANULOCYTES # BLD AUTO: 0.03 X10(3) UL (ref 0–1)
IMM GRANULOCYTES NFR BLD: 0.3 %
LYMPHOCYTES # BLD AUTO: 1.85 X10(3) UL (ref 1–4)
LYMPHOCYTES NFR BLD AUTO: 20.5 %
MCH RBC QN AUTO: 32.2 PG (ref 26–34)
MCHC RBC AUTO-ENTMCNC: 34.3 G/DL (ref 31–37)
MCV RBC AUTO: 93.9 FL
MONOCYTES # BLD AUTO: 0.36 X10(3) UL (ref 0.1–1)
MONOCYTES NFR BLD AUTO: 4 %
NEUTROPHILS # BLD AUTO: 6.63 X10 (3) UL (ref 1.5–7.7)
NEUTROPHILS # BLD AUTO: 6.63 X10(3) UL (ref 1.5–7.7)
NEUTROPHILS NFR BLD AUTO: 73.7 %
PLATELET # BLD AUTO: 246 10(3)UL (ref 150–450)
RBC # BLD AUTO: 3.63 X10(6)UL
RH BLOOD TYPE: POSITIVE
RUBV IGG SER QL: POSITIVE
RUBV IGG SER-ACNC: 93.7 IU/ML (ref 10–?)
T PALLIDUM AB SER QL IA: NONREACTIVE
WBC # BLD AUTO: 9 X10(3) UL (ref 4–11)

## 2024-04-09 PROCEDURE — 85025 COMPLETE CBC W/AUTO DIFF WBC: CPT

## 2024-04-09 PROCEDURE — 99211 OFF/OP EST MAY X REQ PHY/QHP: CPT | Performed by: OBSTETRICS & GYNECOLOGY

## 2024-04-09 PROCEDURE — 83020 HEMOGLOBIN ELECTROPHORESIS: CPT

## 2024-04-09 PROCEDURE — 87389 HIV-1 AG W/HIV-1&-2 AB AG IA: CPT

## 2024-04-09 PROCEDURE — 86901 BLOOD TYPING SEROLOGIC RH(D): CPT

## 2024-04-09 PROCEDURE — 86762 RUBELLA ANTIBODY: CPT

## 2024-04-09 PROCEDURE — 86900 BLOOD TYPING SEROLOGIC ABO: CPT

## 2024-04-09 PROCEDURE — 87086 URINE CULTURE/COLONY COUNT: CPT

## 2024-04-09 PROCEDURE — 82950 GLUCOSE TEST: CPT

## 2024-04-09 PROCEDURE — 86850 RBC ANTIBODY SCREEN: CPT

## 2024-04-09 PROCEDURE — 83021 HEMOGLOBIN CHROMOTOGRAPHY: CPT

## 2024-04-09 PROCEDURE — 86780 TREPONEMA PALLIDUM: CPT

## 2024-04-09 PROCEDURE — 86803 HEPATITIS C AB TEST: CPT

## 2024-04-09 PROCEDURE — 99213 OFFICE O/P EST LOW 20 MIN: CPT | Performed by: OBSTETRICS & GYNECOLOGY

## 2024-04-09 PROCEDURE — 36415 COLL VENOUS BLD VENIPUNCTURE: CPT

## 2024-04-09 PROCEDURE — 87340 HEPATITIS B SURFACE AG IA: CPT

## 2024-04-09 RX ORDER — CHOLECALCIFEROL (VITAMIN D3) 25 MCG
1 TABLET,CHEWABLE ORAL DAILY
COMMUNITY

## 2024-04-09 NOTE — PROGRESS NOTES
Pt is a G 3  P  1 for RN OB Education.       Pregnancy Confirmation apt with: PREMA    LMP: 01/15/2024    US: 2024 6w 6d    Working MARYSOL:  10/21/2024    Pre  BMI:   29.34    Medical Hx significant for: Past Medical History    Diagnosis Date Comments   Rosacea [L71.9]     Allergic rhinitis [J30.9]     Anemia [D64.9]     Heart murmur [R01.1]  born with heart murmur unsure if was transfused blood       Obstetrical Hx significant for:  x1 and SAB x1    Surgical Hx significant for: ast Surgical History     Laterality Date Comments   ANESTH,OPEN HEART SURGERY [36773]   8yo for heart murmur for PFO?   COLPOSCOPY,BX CERVIX/ENDOCERV CURR [75246]  10/2018     []  2013    REMOVE INTRAUTERINE DEVICE [74884]          EPDS score: 7    OUD Screening: Patient has answered NO to 5p questions and has no  risk factors.     Patient given \"What Pregnant Women Need to Know\" handout.     Educational material reviewed with patient: Prenatal care, nutrition, weight gain recommendations, travel, exercise, intercourse, pregnancy changes, safe medications, pregnancy and work, fetal movement, labor and  labor, warning signs, food safety, tdap, cord blood, breastfeeding, circumcision, and Group B strep.     Pt agrees to blood transfusion if needed: yes    PN labs ordered     Optional genetic screening discussed.  Pt desires prequel and foresight     [facility] Media Policy: Reviewed and verbalized understanding.     NOB appt: today with PREMA    Lab appt: today    Vaccines: informed of flu/covid/rsv/dtap    ASA protocol :  n/a    SODH:  desires referral for [facility] and [facility].

## 2024-04-09 NOTE — PROGRESS NOTES
[facility]  Obstetrics and Gynecology  History & Physical    CC: Patient is here to establish prenatal care     Subjective:     HPI:  Kailyn Vieyra is a 33 year old  female at 12w1d who presents today to establish prenatal care. Patient reports nausea continues. Zofran 8 mg helping. No bleeding/brown discharge for a few weeks now. No cramping.     LMP: Patient's last menstrual period was 01/15/2024 (exact date).  MARYSOL:  Estimated Date of Delivery: 10/21/24    OB:  OB History    Para Term  AB Living   3 1 1   1 1   SAB IAB Ectopic Multiple Live Births   1       1      # Outcome Date GA Lbr Richard/2nd Weight Sex Delivery Anes PTL Lv   3 Current            2 SAB 23           1 Term 13   7 lb 1 oz (3.204 kg) M NORMAL SPONT   ESE       GYN:   Pap hx- Last Pap 10/26/22: NILM, HPV negative.    PMH:   Past Medical History:   Diagnosis Date    Allergic rhinitis     Anemia     Heart murmur     born with heart murmur unsure if was transfused blood    Rosacea        PSH:    Past Surgical History:   Procedure Laterality Date    ANESTH,OPEN HEART SURGERY      6yo for heart murmur for PFO?    COLPOSCOPY,BX CERVIX/ENDOCERV CURR  10/2018      2013    REMOVE INTRAUTERINE DEVICE         MEDS:  Current Outpatient Medications on File Prior to Visit   Medication Sig Dispense Refill    prenatal vitamin with DHA 27-0.8-228 MG Oral Cap Take 1 capsule by mouth daily.      ondansetron (ZOFRAN) 8 MG tablet Take 1 tablet (8 mg total) by mouth every 8 (eight) hours as needed for Nausea. 30 tablet 3     No current facility-administered medications on file prior to visit.       Allergies:    Allergies   Allergen Reactions    Cat Hair Extract OTHER (SEE COMMENTS)     Watery eyes, stuffy nose    Dander OTHER (SEE COMMENTS)     Runny nose, stuffy nose, red/ watery eyes     Immunizations:  Immunization History   Administered Date(s) Administered    Covid-19 Vaccine Pfizer 30 mcg/0.3 ml  04/14/2021, 05/05/2021, 01/10/2022       Family Hx:      Family History   Problem Relation Age of Onset    Diabetes Father         69    Hypertension Father     Renal Disease Father     Hypertension Mother     Arthritis Mother     Crohn's Disease Son     Other (bladder cancer) Maternal Grandmother     Cancer Maternal Grandmother     No Known Problems Brother     No Known Problems Brother     Renal Disease Half-Brother     No Known Problems Half-Sister        SocialHx:        Social History     Socioeconomic History    Marital status: Single   Tobacco Use    Smoking status: Never    Smokeless tobacco: Never   Vaping Use    Vaping Use: Never used   Substance and Sexual Activity    Alcohol use: Not Currently    Drug use: No    Sexual activity: Yes     Partners: Male     Birth control/protection: I.U.D.   Other Topics Concern    Reaction to local anesthetic No    Pt has a pacemaker No    Pt has a defibrillator No     Social Determinants of Health     Financial Resource Strain: Low Risk  (4/9/2024)    Financial Resource Strain     Difficulty of Paying Living Expenses: Somewhat hard     Med Affordability: No   Food Insecurity: No Food Insecurity (4/9/2024)    Food Insecurity     Food Insecurity: Never true   Transportation Needs: No Transportation Needs (4/9/2024)    Transportation Needs     Lack of Transportation: No   Stress: Stress Concern Present (4/9/2024)    Stress     Feeling of Stress : Yes   Housing Stability: Low Risk  (4/9/2024)    Housing Stability     Housing Instability: No   Review of Systems:  General: no complaints  Eyes: no complaints  Respiratory: no complaints  Cardiovascular: no complaints  GI: See HPI  :  See HPI  Hematological/lymphatic: no complaints  Breast: no complaints  Psychiatric: no complaints  Endocrine:no complaints  Neurological: no complaints  Immunological: no complaints  Musculoskeletal:no complaints    Objective:     Vitals:    04/09/24 1441   BP: 106/70   Weight: 164 lb 3.2 oz  (74.5 kg)   Height: 62\"        Exam:   GENERAL: well developed, well nourished, in no apparent distress, alert and orientated X 3  PSYCH: mood and affect stable   SKIN: no rashes, no lesions  LUNGS: respiration unlabored  CARDIOVASCULAR: no peripheral edema or varicosities, skin warm and dry  ABDOMEN: Soft, non distended; non tender. FHR by bedside doppler 160 BPM.   EXTREMITIES:  Normal range of motion, strength 5/5 walking    Assessment/Plan:     Kailyn Vieyra is a 33 year old  female at 12w1d who presents today to establish prenatal care.    There is no problem list on file for this patient.        Prenatal care  - prenatal labs ordered  - Pap: up to date.   - continue PNV daily  - CHERELLE 3 weeks.     Genetic Screening tests  - Discussion held with patient about genetic screening: Cell free DNA and amniocentesis.  - Patient offered Amniocentesis and declined.   - Pt desires cell free DNA. Ordered and collected today.  - Myriad Carrier screening including Cystic fibrosis, SMA, and hemoglobinopathies: offered and patient agreeable, ordered.       Patient education  - Pt counseled on safety, diet, exercise, caffiene, tobacco, ETOH, sexual activity, ER precautions, diet, exercise, appropriate weight gain, travel, appropriate otc medication use, substance abuse and pets.  - Counseled on taking a PNV with 0.4mg of folic acid   - Limiting intake of alcohol, coffee, tea, soda, and other foods containing caffeine  - Limit intake of fish to twice weekly to limit mercury exposure    RTC in 3 weeks     Dr. Mathieu Mercedes MD    [facility]     This note was created by [employer] voice recognition. Errors in content may be related to improper recognition by the system; efforts to review and correct have been done but errors may still exist. Please be advised the primary purpose of this note is for me to communicate medical care. Standard sentence structure is not always used. Medical terminology and medical abbreviations may  be used. There may be grammatical, typographical, and automated fill ins that may have errors missed in proofreading.       Total patient time was 20 minutes in evaluation and management.

## 2024-04-10 LAB
HGB A2 MFR BLD: 2.9 % (ref 1.5–3.5)
HGB PNL BLD ELPH: 97.1 % (ref 95.5–100)

## 2024-04-16 ENCOUNTER — TELEPHONE (OUTPATIENT)
Dept: OBGYN CLINIC | Facility: CLINIC | Age: 34
End: 2024-04-16

## 2024-04-16 ENCOUNTER — TELEPHONE (OUTPATIENT)
Age: 34
End: 2024-04-16

## 2024-04-16 NOTE — TELEPHONE ENCOUNTER
RN spoke with pt, verified name and . RN provided pt with neg/normal Prequel results Gender placed in an envelope, per pt request. Pt verbalized understanding and agreed with POC.

## 2024-04-19 DIAGNOSIS — O99.810 ABNORMAL MATERNAL GLUCOSE TOLERANCE, ANTEPARTUM (HCC): Primary | ICD-10-CM

## 2024-04-22 ENCOUNTER — LABORATORY ENCOUNTER (OUTPATIENT)
Dept: LAB | Age: 34
End: 2024-04-22
Attending: OBSTETRICS & GYNECOLOGY
Payer: MEDICAID

## 2024-04-22 ENCOUNTER — TELEPHONE (OUTPATIENT)
Dept: OBGYN CLINIC | Facility: CLINIC | Age: 34
End: 2024-04-22

## 2024-04-22 PROBLEM — Z14.8 GENETIC CARRIER STATUS: Status: ACTIVE | Noted: 2024-04-22

## 2024-04-22 LAB
GLUCOSE 1H P GLC SERPL-MCNC: 84 MG/DL
GLUCOSE 2H P GLC SERPL-MCNC: 107 MG/DL
GLUCOSE 3H P GLC SERPL-MCNC: 85 MG/DL (ref 70–140)
GLUCOSE P FAST SERPL-MCNC: 81 MG/DL

## 2024-04-22 PROCEDURE — 36415 COLL VENOUS BLD VENIPUNCTURE: CPT | Performed by: OBSTETRICS & GYNECOLOGY

## 2024-04-22 PROCEDURE — 82952 GTT-ADDED SAMPLES: CPT | Performed by: OBSTETRICS & GYNECOLOGY

## 2024-04-22 PROCEDURE — 82951 GLUCOSE TOLERANCE TEST (GTT): CPT | Performed by: OBSTETRICS & GYNECOLOGY

## 2024-04-29 ENCOUNTER — TELEPHONE (OUTPATIENT)
Age: 34
End: 2024-04-29

## 2024-04-29 NOTE — TELEPHONE ENCOUNTER
Ajay,   Thank you for speaking with me today. Below are the behavioral health providers I think might be a good fit and that are showing in network with your insurance. Please call the provider directly to schedule an appointment. If distance is a concern please inquire on virtual service options.   [facility]  Patience Bell  [address]  [place] [address]  Phone: [phone]  Patience Padilla - [facility]    Cristina N[facility] [employer]   [address], [address] [place] [address]  Phone: [phone]  [facility] - Counseling Professional    [facility]   Camille Singh  [address]  [place] [address]  Phone: [phone]  Camille Singh Bio  [facility]   Dr. Sandra Chowdhury  [address], [address]   [place] [address]  Phone: [phone]  [email or web address]    [facility]    [facility]  [address]  [place] [address]  Phone: [phone]  Psychiatrist Near Me in [place] or [facility] ([employer]    Nurturing Mom support group   A support group for new and expectant moms looking for support with the transition to parenthood as well as those experiencing symptoms of  anxiety and/or depression.  Virtual meetings: Meets twice a month on  via [place].  In-person sessions: Meets once a month immediately following Cradle Talk ([facility]) and once a month immediately following Mom & Baby Hour ([facility])  Contact [email or web address].  [email or web address]  This is a phone line dedicated to women, or anyone concerned about a woman, who may be experiencing signs or symptoms of postpartum depression.  Call our Mom's Line at  [phone].    [facility] Moms Support Groups   Mom-to Mom chat/Cradle talk virtually every Monday& Friday 10am   Nurturing Mom Support group for new and expecting mother virtually on the 1st and 4th Wednesdays of the month from 11:45am-12:45pm. In person on the 2nd & 3rd Wednesdays of the month.   Phone: [phone]  Contact [email or web address].  New Moms Support Groups  [facility] ([email or web address].org)    Warm regards,   If any safety concerns arise it is advised to call 911, go to the nearest emergency room. [facility] crisis walk-in facility is located at [address] in [place]. Contact Number for [facility] is [phone].  Jacy Beckford LCPC  (she/her/hers)  Lead Patient Care Navigator Mental Health   [facility]/Mental Health Division    [email or web address]  [phone]  work

## 2024-05-08 ENCOUNTER — HOSPITAL ENCOUNTER (OUTPATIENT)
Dept: CV DIAGNOSTICS | Facility: HOSPITAL | Age: 34
Discharge: HOME | End: 2024-05-08
Attending: STUDENT IN AN ORGANIZED HEALTH CARE EDUCATION/TRAINING PROGRAM
Payer: MEDICAID

## 2024-05-08 ENCOUNTER — TELEPHONE (OUTPATIENT)
Dept: FAMILY MEDICINE CLINIC | Facility: CLINIC | Age: 34
End: 2024-05-08

## 2024-05-08 DIAGNOSIS — Z3A.01 LESS THAN 8 WEEKS GESTATION OF PREGNANCY (HCC): ICD-10-CM

## 2024-05-08 DIAGNOSIS — Z98.890 HISTORY OF HEART SURGERY: ICD-10-CM

## 2024-05-08 DIAGNOSIS — Z98.890 HISTORY OF HEART SURGERY: Primary | ICD-10-CM

## 2024-05-08 PROCEDURE — 93306 TTE W/DOPPLER COMPLETE: CPT | Performed by: STUDENT IN AN ORGANIZED HEALTH CARE EDUCATION/TRAINING PROGRAM

## 2024-05-08 NOTE — TELEPHONE ENCOUNTER
On call note-echocardio dept calling to confirm order for echo w bubbles. No documentation found for echo but past medical history shows \"open heart sx for pfo? \". Advised to proceed w bubbles as ordered by Dr Murdock.

## 2024-05-09 ENCOUNTER — TELEPHONE (OUTPATIENT)
Dept: OBGYN CLINIC | Facility: CLINIC | Age: 34
End: 2024-05-09

## 2024-05-09 NOTE — TELEPHONE ENCOUNTER
Patient is calling requesting to speak to RN regarding athlete's foot, per patient requesting to know what can she use to treat it that is safe due to her pregnancy. Please follow up with patient.

## 2024-05-13 NOTE — TELEPHONE ENCOUNTER
Called pt and scheduled CHERELLE for 05/15/2024 with JN, informed of seeing provider every 4 weeks until 28 weeks.  Also informed can use over the counter medication for foot but to send pic via my chart for provider review or call PCP or discuss at visit on Wednesday.  Pt agrees.

## 2024-05-15 ENCOUNTER — ROUTINE PRENATAL (OUTPATIENT)
Dept: OBGYN CLINIC | Facility: CLINIC | Age: 34
End: 2024-05-15

## 2024-05-15 ENCOUNTER — LAB ENCOUNTER (OUTPATIENT)
Dept: LAB | Facility: REFERENCE LAB | Age: 34
End: 2024-05-15
Attending: OBSTETRICS & GYNECOLOGY

## 2024-05-15 VITALS
BODY MASS INDEX: 31.4 KG/M2 | HEIGHT: 62 IN | WEIGHT: 170.63 LBS | SYSTOLIC BLOOD PRESSURE: 106 MMHG | DIASTOLIC BLOOD PRESSURE: 66 MMHG

## 2024-05-15 DIAGNOSIS — Z36.9 UNSPECIFIED ANTENATAL SCREENING (HCC): ICD-10-CM

## 2024-05-15 DIAGNOSIS — Z36.9 UNSPECIFIED ANTENATAL SCREENING (HCC): Primary | ICD-10-CM

## 2024-05-15 PROCEDURE — 36415 COLL VENOUS BLD VENIPUNCTURE: CPT

## 2024-05-15 PROCEDURE — 82105 ALPHA-FETOPROTEIN SERUM: CPT

## 2024-05-15 PROCEDURE — 99213 OFFICE O/P EST LOW 20 MIN: CPT | Performed by: OBSTETRICS & GYNECOLOGY

## 2024-05-15 NOTE — PROGRESS NOTES
Doing well. No OB complaints. Possible FM.   Ordered MSAFP and anatomy ultrasound in 3 weeks.   CHERELLE 3 weeks.     Dr. Mathieu Mercedes MD    [facility]     This note was created by Dragon voice recognition. Errors in content may be related to improper recognition by the system; efforts to review and correct have been done but errors may still exist. Please be advised the primary purpose of this note is for me to communicate medical care. Standard sentence structure is not always used. Medical terminology and medical abbreviations may be used. There may be grammatical, typographical, and automated fill ins that may have errors missed in proofreading.       Total patient time was 20 minutes in evaluation and management.

## 2024-05-18 LAB
AFP MOM: 1.09
AFP VALUE: 40.4 NG/ML
GA ON COLL DATE: 17.3 WEEKS
INSULIN DEP AFP: NO
MAT AGE AT EDD: 33.9 YR
MULTIPLE GEST AFP: NO
OSBR RISK 1 IN AFP: 9231
WEIGHT AFP: 170 LBS

## 2024-06-05 ENCOUNTER — ROUTINE PRENATAL (OUTPATIENT)
Dept: OBGYN CLINIC | Facility: CLINIC | Age: 34
End: 2024-06-05
Payer: MEDICAID

## 2024-06-05 ENCOUNTER — ULTRASOUND ENCOUNTER (OUTPATIENT)
Dept: OBGYN CLINIC | Facility: CLINIC | Age: 34
End: 2024-06-05
Payer: MEDICAID

## 2024-06-05 VITALS
WEIGHT: 174 LBS | HEIGHT: 62 IN | BODY MASS INDEX: 32.02 KG/M2 | DIASTOLIC BLOOD PRESSURE: 62 MMHG | SYSTOLIC BLOOD PRESSURE: 110 MMHG

## 2024-06-05 DIAGNOSIS — Z34.82 ENCOUNTER FOR SUPERVISION OF OTHER NORMAL PREGNANCY IN SECOND TRIMESTER (HCC): Primary | ICD-10-CM

## 2024-06-05 PROCEDURE — 99212 OFFICE O/P EST SF 10 MIN: CPT | Performed by: OBSTETRICS & GYNECOLOGY

## 2024-06-05 NOTE — PROGRESS NOTES
32 y/o  at 20 W. Had usn today and unable to see all of anatomy. Referral given to get usn in 3 -4 W.

## 2024-07-01 ENCOUNTER — ROUTINE PRENATAL (OUTPATIENT)
Dept: OBGYN CLINIC | Facility: CLINIC | Age: 34
End: 2024-07-01
Payer: MEDICAID

## 2024-07-01 ENCOUNTER — ULTRASOUND ENCOUNTER (OUTPATIENT)
Dept: OBGYN CLINIC | Facility: CLINIC | Age: 34
End: 2024-07-01
Payer: MEDICAID

## 2024-07-01 VITALS
HEIGHT: 62 IN | WEIGHT: 179.31 LBS | SYSTOLIC BLOOD PRESSURE: 130 MMHG | DIASTOLIC BLOOD PRESSURE: 68 MMHG | BODY MASS INDEX: 33 KG/M2

## 2024-07-01 DIAGNOSIS — Z34.82 ENCOUNTER FOR SUPERVISION OF OTHER NORMAL PREGNANCY IN SECOND TRIMESTER (HCC): ICD-10-CM

## 2024-07-01 DIAGNOSIS — Z34.80 ENCOUNTER FOR SUPERVISION OF OTHER NORMAL PREGNANCY, UNSPECIFIED TRIMESTER (HCC): Primary | ICD-10-CM

## 2024-07-01 NOTE — PROGRESS NOTES
Denies pain, bleeding, LOF. Positive fetal movement    33 year old  at 24w0d by LMP     Return OB  Pre- Care: UTD. Glucola and cbc orders placed. Can do next visit.  Tdap next visit    Had ultrasound to obtain heart views - normal   Patient Active Problem List   Diagnosis    Genetic carrier status    Encounter for supervision of other normal pregnancy, unspecified trimester (HCC)       - Return to clinic in: 4    25 min spent with chart review, obtaining history, performing exam and counseling

## 2024-07-29 ENCOUNTER — ROUTINE PRENATAL (OUTPATIENT)
Dept: OBGYN CLINIC | Facility: CLINIC | Age: 34
End: 2024-07-29
Payer: MEDICAID

## 2024-07-29 ENCOUNTER — LAB ENCOUNTER (OUTPATIENT)
Dept: LAB | Age: 34
End: 2024-07-29
Attending: OBSTETRICS & GYNECOLOGY
Payer: MEDICAID

## 2024-07-29 VITALS
HEIGHT: 62 IN | SYSTOLIC BLOOD PRESSURE: 104 MMHG | DIASTOLIC BLOOD PRESSURE: 66 MMHG | WEIGHT: 184.19 LBS | BODY MASS INDEX: 33.9 KG/M2

## 2024-07-29 DIAGNOSIS — Z34.80 ENCOUNTER FOR SUPERVISION OF OTHER NORMAL PREGNANCY, UNSPECIFIED TRIMESTER (HCC): ICD-10-CM

## 2024-07-29 DIAGNOSIS — Z34.82 ENCOUNTER FOR SUPERVISION OF OTHER NORMAL PREGNANCY IN SECOND TRIMESTER (HCC): Primary | ICD-10-CM

## 2024-07-29 LAB
BASOPHILS # BLD AUTO: 0.01 X10(3) UL (ref 0–0.2)
BASOPHILS NFR BLD AUTO: 0.1 %
DEPRECATED RDW RBC AUTO: 47.5 FL (ref 35.1–46.3)
EOSINOPHIL # BLD AUTO: 0.09 X10(3) UL (ref 0–0.7)
EOSINOPHIL NFR BLD AUTO: 1.1 %
ERYTHROCYTE [DISTWIDTH] IN BLOOD BY AUTOMATED COUNT: 13.1 % (ref 11–15)
GLUCOSE 1H P GLC SERPL-MCNC: 101 MG/DL
HCT VFR BLD AUTO: 33.8 %
HGB BLD-MCNC: 11.3 G/DL
IMM GRANULOCYTES # BLD AUTO: 0.04 X10(3) UL (ref 0–1)
IMM GRANULOCYTES NFR BLD: 0.5 %
LYMPHOCYTES # BLD AUTO: 1.75 X10(3) UL (ref 1–4)
LYMPHOCYTES NFR BLD AUTO: 21.6 %
MCH RBC QN AUTO: 33 PG (ref 26–34)
MCHC RBC AUTO-ENTMCNC: 33.4 G/DL (ref 31–37)
MCV RBC AUTO: 98.8 FL
MONOCYTES # BLD AUTO: 0.41 X10(3) UL (ref 0.1–1)
MONOCYTES NFR BLD AUTO: 5.1 %
NEUTROPHILS # BLD AUTO: 5.79 X10 (3) UL (ref 1.5–7.7)
NEUTROPHILS # BLD AUTO: 5.79 X10(3) UL (ref 1.5–7.7)
NEUTROPHILS NFR BLD AUTO: 71.6 %
PLATELET # BLD AUTO: 235 10(3)UL (ref 150–450)
RBC # BLD AUTO: 3.42 X10(6)UL
WBC # BLD AUTO: 8.1 X10(3) UL (ref 4–11)

## 2024-07-29 PROCEDURE — 85025 COMPLETE CBC W/AUTO DIFF WBC: CPT

## 2024-07-29 PROCEDURE — 82950 GLUCOSE TEST: CPT

## 2024-07-29 PROCEDURE — 36415 COLL VENOUS BLD VENIPUNCTURE: CPT

## 2024-08-15 ENCOUNTER — ROUTINE PRENATAL (OUTPATIENT)
Dept: OBGYN CLINIC | Facility: CLINIC | Age: 34
End: 2024-08-15
Payer: MEDICAID

## 2024-08-15 VITALS
DIASTOLIC BLOOD PRESSURE: 68 MMHG | HEIGHT: 62 IN | SYSTOLIC BLOOD PRESSURE: 118 MMHG | WEIGHT: 188.38 LBS | BODY MASS INDEX: 34.67 KG/M2

## 2024-08-15 DIAGNOSIS — Z34.80 ENCOUNTER FOR SUPERVISION OF OTHER NORMAL PREGNANCY, UNSPECIFIED TRIMESTER (HCC): Primary | ICD-10-CM

## 2024-08-15 PROCEDURE — 99213 OFFICE O/P EST LOW 20 MIN: CPT | Performed by: OBSTETRICS & GYNECOLOGY

## 2024-08-15 NOTE — PROGRESS NOTES
Denies pain, bleeding, LOF. Positive fetal movement    33 year old  at 30w3d by LMP     Return OB  Pre- Care: UTD.   Growth ultrasound next visit.   Patient Active Problem List   Diagnosis    Encounter for supervision of other normal pregnancy, unspecified trimester (HCC)       - Return to clinic in: 4    20 min spent with chart review, obtaining history, performing exam and counseling

## 2024-08-29 ENCOUNTER — ULTRASOUND ENCOUNTER (OUTPATIENT)
Dept: OBGYN CLINIC | Facility: CLINIC | Age: 34
End: 2024-08-29
Payer: MEDICAID

## 2024-08-29 DIAGNOSIS — Z34.80 ENCOUNTER FOR SUPERVISION OF OTHER NORMAL PREGNANCY, UNSPECIFIED TRIMESTER (HCC): ICD-10-CM

## 2024-08-29 PROCEDURE — 76816 OB US FOLLOW-UP PER FETUS: CPT | Performed by: OBSTETRICS & GYNECOLOGY

## 2024-09-06 ENCOUNTER — ROUTINE PRENATAL (OUTPATIENT)
Dept: OBGYN CLINIC | Facility: CLINIC | Age: 34
End: 2024-09-06
Payer: MEDICAID

## 2024-09-06 VITALS
WEIGHT: 190 LBS | HEIGHT: 62 IN | BODY MASS INDEX: 34.96 KG/M2 | DIASTOLIC BLOOD PRESSURE: 72 MMHG | SYSTOLIC BLOOD PRESSURE: 116 MMHG

## 2024-09-06 DIAGNOSIS — Z34.80 ENCOUNTER FOR SUPERVISION OF OTHER NORMAL PREGNANCY, UNSPECIFIED TRIMESTER (HCC): Primary | ICD-10-CM

## 2024-09-06 PROCEDURE — 99213 OFFICE O/P EST LOW 20 MIN: CPT | Performed by: OBSTETRICS & GYNECOLOGY

## 2024-09-06 NOTE — PROGRESS NOTES
Denies pain, bleeding, LOF. Positive fetal movement    33 year old  at 33w4d by LMP     Return OB  Pre- Care: UTD.   Growth ultrasound   -   EFW 2348g (5lb3oz) ± 343g Fetal growth appears to be 71.1%. Vertex    Patient Active Problem List   Diagnosis    Encounter for supervision of other normal pregnancy, unspecified trimester (HCC)       - Return to clinic in: 4    20 min spent with chart review, obtaining history, performing exam and counseling

## 2024-09-20 ENCOUNTER — ROUTINE PRENATAL (OUTPATIENT)
Dept: OBGYN CLINIC | Facility: CLINIC | Age: 34
End: 2024-09-20
Payer: MEDICAID

## 2024-09-20 VITALS
DIASTOLIC BLOOD PRESSURE: 70 MMHG | HEIGHT: 62 IN | WEIGHT: 191.88 LBS | BODY MASS INDEX: 35.31 KG/M2 | SYSTOLIC BLOOD PRESSURE: 102 MMHG

## 2024-09-20 DIAGNOSIS — Z3A.35 35 WEEKS GESTATION OF PREGNANCY (HCC): Primary | ICD-10-CM

## 2024-09-20 DIAGNOSIS — Z34.80 ENCOUNTER FOR SUPERVISION OF OTHER NORMAL PREGNANCY, UNSPECIFIED TRIMESTER (HCC): ICD-10-CM

## 2024-09-20 PROCEDURE — 99213 OFFICE O/P EST LOW 20 MIN: CPT | Performed by: OBSTETRICS & GYNECOLOGY

## 2024-09-20 PROCEDURE — 87081 CULTURE SCREEN ONLY: CPT | Performed by: OBSTETRICS & GYNECOLOGY

## 2024-09-20 PROCEDURE — 87150 DNA/RNA AMPLIFIED PROBE: CPT | Performed by: OBSTETRICS & GYNECOLOGY

## 2024-09-20 NOTE — PROGRESS NOTES
RETURN OB VISIT    Kailyn Vieyra is a 33 year old  at 35w4d presenting for return OB visit. Today she reports occasional mild cramping, no vaginal bleeding or loss of fluid. Baby is moving well.     Encounter for supervision of other normal pregnancy, unspecified trimester (HCC)  -GBS and RSV vaccine today    Labor precautions reviewed.    Christina Arcos DO

## 2024-09-21 LAB — GROUP B STREP BY PCR FOR PCR OVT: NEGATIVE

## 2024-09-26 ENCOUNTER — ROUTINE PRENATAL (OUTPATIENT)
Dept: OBGYN CLINIC | Facility: CLINIC | Age: 34
End: 2024-09-26
Payer: MEDICAID

## 2024-09-26 VITALS
SYSTOLIC BLOOD PRESSURE: 112 MMHG | WEIGHT: 191.19 LBS | BODY MASS INDEX: 35.18 KG/M2 | HEIGHT: 62 IN | DIASTOLIC BLOOD PRESSURE: 70 MMHG

## 2024-09-26 DIAGNOSIS — Z34.80 ENCOUNTER FOR SUPERVISION OF OTHER NORMAL PREGNANCY, UNSPECIFIED TRIMESTER (HCC): Primary | ICD-10-CM

## 2024-09-26 PROCEDURE — 99213 OFFICE O/P EST LOW 20 MIN: CPT | Performed by: OBSTETRICS & GYNECOLOGY

## 2024-10-02 ENCOUNTER — ROUTINE PRENATAL (OUTPATIENT)
Dept: OBGYN CLINIC | Facility: CLINIC | Age: 34
End: 2024-10-02
Payer: MEDICAID

## 2024-10-02 VITALS
BODY MASS INDEX: 35.28 KG/M2 | DIASTOLIC BLOOD PRESSURE: 64 MMHG | SYSTOLIC BLOOD PRESSURE: 102 MMHG | HEIGHT: 62 IN | WEIGHT: 191.69 LBS

## 2024-10-02 DIAGNOSIS — Z34.80 ENCOUNTER FOR SUPERVISION OF OTHER NORMAL PREGNANCY, UNSPECIFIED TRIMESTER (HCC): Primary | ICD-10-CM

## 2024-10-02 PROCEDURE — 99213 OFFICE O/P EST LOW 20 MIN: CPT | Performed by: OBSTETRICS & GYNECOLOGY

## 2024-10-02 NOTE — ASSESSMENT & PLAN NOTE
-SSE without evidence of ROM, suspect small amount of incontinence  -fetal movement appreciated during visit  -reassured patient that fetal movement may feel different closer to term. Discussed kick counts. If movement is decreased or abnormal with nightly kick counts she will come to the hospital

## 2024-10-02 NOTE — PROGRESS NOTES
RETURN OB VISIT    Kailyn Vieyra is a 33 year old  at 37w2d presenting for return OB visit. Today she reports no contractions or vaginal bleeding. There was an episode of leaking fluid of unknown origin a few days ago. It was just a few drops as she was getting into the shower. It was colorless and odorless. No leaking since then.     She also reports fetal movement continues but is overall less intense. She wonders if baby is running out of space.     SVE C/L/H  SSE: cervix visually closed, no pooling, no leaking with valsalva, negative nitrazine      Encounter for supervision of other normal pregnancy, unspecified trimester (HCC)  -SSE without evidence of ROM, suspect small amount of incontinence  -fetal movement appreciated during visit  -reassured patient that fetal movement may feel different closer to term. Discussed kick counts. If movement is decreased or abnormal with nightly kick counts she will come to the [facility]       Christina Arcos, DO

## 2024-10-11 ENCOUNTER — ROUTINE PRENATAL (OUTPATIENT)
Dept: OBGYN CLINIC | Facility: CLINIC | Age: 34
End: 2024-10-11
Payer: MEDICAID

## 2024-10-11 VITALS
DIASTOLIC BLOOD PRESSURE: 80 MMHG | HEIGHT: 62 IN | SYSTOLIC BLOOD PRESSURE: 106 MMHG | BODY MASS INDEX: 35.33 KG/M2 | WEIGHT: 192 LBS

## 2024-10-11 DIAGNOSIS — Z34.80 ENCOUNTER FOR SUPERVISION OF OTHER NORMAL PREGNANCY, UNSPECIFIED TRIMESTER (HCC): Primary | ICD-10-CM

## 2024-10-11 PROCEDURE — 99213 OFFICE O/P EST LOW 20 MIN: CPT | Performed by: OBSTETRICS & GYNECOLOGY

## 2024-10-11 NOTE — PROGRESS NOTES
Denies pain, bleeding, LOF. Positive fetal movement    33 year old  at 38w4d by LMP     Return OB  Pre- Care: UTD.  - labor signs reviewed, consider cervix sweep next appt.         Patient Active Problem List   Diagnosis    Encounter for supervision of other normal pregnancy, unspecified trimester (HCC)       - Return to clinic in: 1 week    20 min spent with chart review, obtaining history, performing exam and counseling    Erika Christiansen DO

## 2024-10-14 ENCOUNTER — HOSPITAL ENCOUNTER (OUTPATIENT)
Facility: HOSPITAL | Age: 34
Discharge: HOME | End: 2024-10-14
Attending: OBSTETRICS & GYNECOLOGY | Admitting: OBSTETRICS & GYNECOLOGY
Payer: MEDICAID

## 2024-10-14 ENCOUNTER — MOBILE ENCOUNTER (OUTPATIENT)
Dept: OBGYN CLINIC | Facility: CLINIC | Age: 34
End: 2024-10-14

## 2024-10-14 PROCEDURE — 99214 OFFICE O/P EST MOD 30 MIN: CPT

## 2024-10-14 PROCEDURE — 59025 FETAL NON-STRESS TEST: CPT

## 2024-10-15 NOTE — PROGRESS NOTES
Returned patient’s call regarding leaking fluid. She reports small gushes of clear, odorless fluid with movement. Her abdomen feels “different” but no painful contractions or vaginal bleeding. She is feeling baby move. Advised patient present to triage for rule out rupture. Discussed if rupture is confirmed recommendation will be for admission and likely AOL. Patient voiced understanding.

## 2024-10-15 NOTE — TRIAGE
[facility]  part of [facility]      Triage Note    Kailyn Vieyra Patient Status:  Outpatient    1990 MRN [id number]   Location [facility] Attending Christina Arcos DO   Hosp Day # 0 PCP No primary care provider on file.      Para:   Estimated Date of Delivery: 10/21/24  Gestation: 39w0d    Chief Complaint    Rupture Of Membranes         Allergies:  Allergies[1]    No orders of the defined types were placed in this encounter.      Lab Results   Component Value Date    WBC 8.1 2024    HGB 11.3 (L) 2024    HCT 33.8 (L) 2024    .0 2024    CREATSERUM 0.75 2024    BUN 11 2024     2024    K 3.8 2024     2024    CO2 23.0 2024    GLU 84 2024    CA 8.9 2024    ALB 4.4 2024    ALKPHO 54 2024    BILT 0.4 2024    TP 7.3 2024    AST 16 2024    ALT 15 2024    TSH 2.444 2024       Clinitek UA  Lab Results   Component Value Date    URINECUL No Growth at 18-24 hrs. 2024       UA  No results found for: \"COLORUR\", \"CLARITY\", \"SPECGRAVITY\", \"PROUR\", \"GLUUR\", \"KETUR\", \"BILUR\", \"BLOODURINE\", \"NITRITE\", \"UROBILINOGEN\", \"LEUUR\", \"UASA\"    There were no vitals filed for this visit.    NST  Variability: Moderate           Accelerations: Yes           Decelerations: None            Baseline: 120 BPM           Uterine Irritability: No           Contractions: Not present                                        Acoustic Stimulator: No           Nonstress Test Interpretation: Reactive           Nonstress Test Second Interpretation: Reactive                        Additional Comments       Chief Complaint   Patient presents with    Rupture Of Membranes     PT states possible leaking amniotic fluid at 4 pm today   's NST reactive, rare contractions. Speculum done, no fluid noted, amniotest negative, no ferning present. Dr Alvarenga  called with pt status and order for discharge received. Discharge info on preeclampsia, kick counts, labor discussed with pt . Pt verbalize understanding.      Bety BELTRAN RN  10/14/2024 9:16 PM         [1]   Allergies  Allergen Reactions    Cat Hair Extract OTHER (SEE COMMENTS)     Watery eyes, stuffy nose    Dander OTHER (SEE COMMENTS)     Runny nose, stuffy nose, red/ watery eyes

## 2024-10-16 ENCOUNTER — HOSPITAL ENCOUNTER (INPATIENT)
Facility: HOSPITAL | Age: 34
LOS: 4 days | Discharge: HOME | End: 2024-10-20
Attending: OBSTETRICS & GYNECOLOGY | Admitting: OBSTETRICS & GYNECOLOGY
Payer: MEDICAID

## 2024-10-16 ENCOUNTER — ANESTHESIA EVENT (OUTPATIENT)
Dept: OBGYN UNIT | Facility: HOSPITAL | Age: 34
End: 2024-10-16
Payer: MEDICAID

## 2024-10-16 ENCOUNTER — ANESTHESIA (OUTPATIENT)
Dept: OBGYN UNIT | Facility: HOSPITAL | Age: 34
End: 2024-10-16
Payer: MEDICAID

## 2024-10-16 PROBLEM — Z37.9 NORMAL LABOR (HCC): Status: ACTIVE | Noted: 2024-10-16

## 2024-10-16 LAB
ALBUMIN SERPL-MCNC: 4.1 G/DL (ref 3.2–4.8)
ALBUMIN/GLOB SERPL: 1.5 {RATIO} (ref 1–2)
ALP LIVER SERPL-CCNC: 196 U/L
ALT SERPL-CCNC: 12 U/L
ANION GAP SERPL CALC-SCNC: 10 MMOL/L (ref 0–18)
ANTIBODY SCREEN: POSITIVE
AST SERPL-CCNC: 17 U/L (ref ?–34)
BASOPHILS # BLD AUTO: 0.02 X10(3) UL (ref 0–0.2)
BASOPHILS NFR BLD AUTO: 0.2 %
BILIRUB SERPL-MCNC: 0.3 MG/DL (ref 0.3–1.2)
BUN BLD-MCNC: 7 MG/DL (ref 9–23)
BUN/CREAT SERPL: 12.1 (ref 10–20)
CALCIUM BLD-MCNC: 9.5 MG/DL (ref 8.7–10.4)
CHLORIDE SERPL-SCNC: 108 MMOL/L (ref 98–112)
CO2 SERPL-SCNC: 20 MMOL/L (ref 21–32)
CREAT BLD-MCNC: 0.58 MG/DL
DEPRECATED RDW RBC AUTO: 49 FL (ref 35.1–46.3)
DIRECT COOMBS POLY: NEGATIVE
EGFRCR SERPLBLD CKD-EPI 2021: 122 ML/MIN/1.73M2 (ref 60–?)
EOSINOPHIL # BLD AUTO: 0.07 X10(3) UL (ref 0–0.7)
EOSINOPHIL NFR BLD AUTO: 0.7 %
ERYTHROCYTE [DISTWIDTH] IN BLOOD BY AUTOMATED COUNT: 14.2 % (ref 11–15)
GLOBULIN PLAS-MCNC: 2.7 G/DL (ref 2–3.5)
GLUCOSE BLD-MCNC: 82 MG/DL (ref 70–99)
HCT VFR BLD AUTO: 37.4 %
HGB BLD-MCNC: 13 G/DL
HIV 1+2 AB+HIV1 P24 AG SERPL QL IA: NONREACTIVE
IMM GRANULOCYTES # BLD AUTO: 0.03 X10(3) UL (ref 0–1)
IMM GRANULOCYTES NFR BLD: 0.3 %
LYMPHOCYTES # BLD AUTO: 1.57 X10(3) UL (ref 1–4)
LYMPHOCYTES NFR BLD AUTO: 16.4 %
MCH RBC QN AUTO: 32.6 PG (ref 26–34)
MCHC RBC AUTO-ENTMCNC: 34.8 G/DL (ref 31–37)
MCV RBC AUTO: 93.7 FL
MONOCYTES # BLD AUTO: 0.51 X10(3) UL (ref 0.1–1)
MONOCYTES NFR BLD AUTO: 5.3 %
NEUTROPHILS # BLD AUTO: 7.38 X10 (3) UL (ref 1.5–7.7)
NEUTROPHILS # BLD AUTO: 7.38 X10(3) UL (ref 1.5–7.7)
NEUTROPHILS NFR BLD AUTO: 77.1 %
OSMOLALITY SERPL CALC.SUM OF ELEC: 283 MOSM/KG (ref 275–295)
PLATELET # BLD AUTO: 192 10(3)UL (ref 150–450)
POTASSIUM SERPL-SCNC: 4.2 MMOL/L (ref 3.5–5.1)
PROT SERPL-MCNC: 6.8 G/DL (ref 5.7–8.2)
RBC # BLD AUTO: 3.99 X10(6)UL
RH BLOOD TYPE: POSITIVE
SODIUM SERPL-SCNC: 138 MMOL/L (ref 136–145)
T PALLIDUM AB SER QL IA: NONREACTIVE
WBC # BLD AUTO: 9.6 X10(3) UL (ref 4–11)

## 2024-10-16 PROCEDURE — 10907ZC DRAINAGE OF AMNIOTIC FLUID, THERAPEUTIC FROM PRODUCTS OF CONCEPTION, VIA NATURAL OR ARTIFICIAL OPENING: ICD-10-PCS | Performed by: OBSTETRICS & GYNECOLOGY

## 2024-10-16 RX ORDER — BUPIVACAINE HYDROCHLORIDE 2.5 MG/ML
INJECTION, SOLUTION EPIDURAL; INFILTRATION; INTRACAUDAL
Status: COMPLETED | OUTPATIENT
Start: 2024-10-16 | End: 2024-10-16

## 2024-10-16 RX ORDER — CITRIC ACID/SODIUM CITRATE 334-500MG
30 SOLUTION, ORAL ORAL AS NEEDED
Status: DISCONTINUED | OUTPATIENT
Start: 2024-10-16 | End: 2024-10-17 | Stop reason: HOSPADM

## 2024-10-16 RX ORDER — HYDROCODONE BITARTRATE AND ACETAMINOPHEN 7.5; 325 MG/1; MG/1
1 TABLET ORAL EVERY 6 HOURS PRN
Status: ACTIVE | OUTPATIENT
Start: 2024-10-16 | End: 2024-10-17

## 2024-10-16 RX ORDER — BUPIVACAINE HCL/0.9 % NACL/PF 0.25 %
5 PLASTIC BAG, INJECTION (ML) EPIDURAL AS NEEDED
Status: DISCONTINUED | OUTPATIENT
Start: 2024-10-16 | End: 2024-10-20

## 2024-10-16 RX ORDER — DIPHENHYDRAMINE HCL 25 MG
25 CAPSULE ORAL EVERY 4 HOURS PRN
Status: DISCONTINUED | OUTPATIENT
Start: 2024-10-16 | End: 2024-10-17

## 2024-10-16 RX ORDER — DIPHENHYDRAMINE HYDROCHLORIDE 50 MG/ML
25 INJECTION INTRAMUSCULAR; INTRAVENOUS ONCE
Status: COMPLETED | OUTPATIENT
Start: 2024-10-16 | End: 2024-10-16

## 2024-10-16 RX ORDER — ONDANSETRON 2 MG/ML
4 INJECTION INTRAMUSCULAR; INTRAVENOUS ONCE AS NEEDED
Status: ACTIVE | OUTPATIENT
Start: 2024-10-16 | End: 2024-10-16

## 2024-10-16 RX ORDER — ONDANSETRON 2 MG/ML
4 INJECTION INTRAMUSCULAR; INTRAVENOUS EVERY 6 HOURS PRN
Status: DISCONTINUED | OUTPATIENT
Start: 2024-10-16 | End: 2024-10-17 | Stop reason: HOSPADM

## 2024-10-16 RX ORDER — ACETAMINOPHEN 500 MG
500 TABLET ORAL EVERY 6 HOURS PRN
Status: DISCONTINUED | OUTPATIENT
Start: 2024-10-16 | End: 2024-10-17 | Stop reason: HOSPADM

## 2024-10-16 RX ORDER — SODIUM CHLORIDE, SODIUM LACTATE, POTASSIUM CHLORIDE, CALCIUM CHLORIDE 600; 310; 30; 20 MG/100ML; MG/100ML; MG/100ML; MG/100ML
INJECTION, SOLUTION INTRAVENOUS AS NEEDED
Status: DISCONTINUED | OUTPATIENT
Start: 2024-10-16 | End: 2024-10-17 | Stop reason: HOSPADM

## 2024-10-16 RX ORDER — HALOPERIDOL 5 MG/ML
0.5 INJECTION INTRAMUSCULAR ONCE AS NEEDED
Status: ACTIVE | OUTPATIENT
Start: 2024-10-16 | End: 2024-10-16

## 2024-10-16 RX ORDER — HYDROMORPHONE HYDROCHLORIDE 1 MG/ML
0.4 INJECTION, SOLUTION INTRAMUSCULAR; INTRAVENOUS; SUBCUTANEOUS
Status: ACTIVE | OUTPATIENT
Start: 2024-10-16 | End: 2024-10-17

## 2024-10-16 RX ORDER — IBUPROFEN 600 MG/1
600 TABLET, FILM COATED ORAL ONCE AS NEEDED
Status: DISCONTINUED | OUTPATIENT
Start: 2024-10-16 | End: 2024-10-17 | Stop reason: HOSPADM

## 2024-10-16 RX ORDER — NALOXONE HYDROCHLORIDE 0.4 MG/ML
0.08 INJECTION, SOLUTION INTRAMUSCULAR; INTRAVENOUS; SUBCUTANEOUS
Status: ACTIVE | OUTPATIENT
Start: 2024-10-16 | End: 2024-10-17

## 2024-10-16 RX ORDER — ACETAMINOPHEN 500 MG
1000 TABLET ORAL ONCE
Status: DISCONTINUED | OUTPATIENT
Start: 2024-10-16 | End: 2024-10-17 | Stop reason: HOSPADM

## 2024-10-16 RX ORDER — DIPHENHYDRAMINE HYDROCHLORIDE 50 MG/ML
12.5 INJECTION INTRAMUSCULAR; INTRAVENOUS EVERY 4 HOURS PRN
Status: DISCONTINUED | OUTPATIENT
Start: 2024-10-16 | End: 2024-10-17

## 2024-10-16 RX ORDER — NALBUPHINE HYDROCHLORIDE 10 MG/ML
2.5 INJECTION INTRAMUSCULAR; INTRAVENOUS; SUBCUTANEOUS EVERY 4 HOURS PRN
Status: ACTIVE | OUTPATIENT
Start: 2024-10-16 | End: 2024-10-17

## 2024-10-16 RX ORDER — HYDROMORPHONE HYDROCHLORIDE 1 MG/ML
0.6 INJECTION, SOLUTION INTRAMUSCULAR; INTRAVENOUS; SUBCUTANEOUS
Status: DISPENSED | OUTPATIENT
Start: 2024-10-16 | End: 2024-10-17

## 2024-10-16 RX ORDER — CITRIC ACID/SODIUM CITRATE 334-500MG
SOLUTION, ORAL ORAL
Status: DISCONTINUED
Start: 2024-10-16 | End: 2024-10-17 | Stop reason: WASHOUT

## 2024-10-16 RX ORDER — ACETAMINOPHEN 500 MG
1000 TABLET ORAL EVERY 6 HOURS PRN
Status: DISCONTINUED | OUTPATIENT
Start: 2024-10-16 | End: 2024-10-17 | Stop reason: HOSPADM

## 2024-10-16 RX ORDER — METOCLOPRAMIDE 10 MG/1
10 TABLET ORAL ONCE
Status: DISCONTINUED | OUTPATIENT
Start: 2024-10-16 | End: 2024-10-17 | Stop reason: HOSPADM

## 2024-10-16 RX ORDER — DEXTROSE, SODIUM CHLORIDE, SODIUM LACTATE, POTASSIUM CHLORIDE, AND CALCIUM CHLORIDE 5; .6; .31; .03; .02 G/100ML; G/100ML; G/100ML; G/100ML; G/100ML
INJECTION, SOLUTION INTRAVENOUS CONTINUOUS
Status: DISCONTINUED | OUTPATIENT
Start: 2024-10-16 | End: 2024-10-17 | Stop reason: HOSPADM

## 2024-10-16 RX ORDER — ACETAMINOPHEN 325 MG/1
650 TABLET ORAL EVERY 6 HOURS PRN
Status: ACTIVE | OUTPATIENT
Start: 2024-10-16 | End: 2024-10-17

## 2024-10-16 RX ORDER — LIDOCAINE HYDROCHLORIDE 10 MG/ML
INJECTION, SOLUTION INFILTRATION; PERINEURAL
Status: COMPLETED | OUTPATIENT
Start: 2024-10-16 | End: 2024-10-16

## 2024-10-16 RX ORDER — LIDOCAINE HYDROCHLORIDE 10 MG/ML
30 INJECTION, SOLUTION EPIDURAL; INFILTRATION; INTRACAUDAL; PERINEURAL ONCE
Status: DISCONTINUED | OUTPATIENT
Start: 2024-10-16 | End: 2024-10-17 | Stop reason: HOSPADM

## 2024-10-16 RX ORDER — METOCLOPRAMIDE HYDROCHLORIDE 5 MG/ML
10 INJECTION INTRAMUSCULAR; INTRAVENOUS ONCE
Status: DISCONTINUED | OUTPATIENT
Start: 2024-10-16 | End: 2024-10-17 | Stop reason: HOSPADM

## 2024-10-16 RX ORDER — HYDROCODONE BITARTRATE AND ACETAMINOPHEN 7.5; 325 MG/1; MG/1
2 TABLET ORAL EVERY 6 HOURS PRN
Status: ACTIVE | OUTPATIENT
Start: 2024-10-16 | End: 2024-10-17

## 2024-10-16 RX ORDER — TERBUTALINE SULFATE 1 MG/ML
0.25 INJECTION, SOLUTION SUBCUTANEOUS AS NEEDED
Status: DISCONTINUED | OUTPATIENT
Start: 2024-10-16 | End: 2024-10-17 | Stop reason: HOSPADM

## 2024-10-16 RX ORDER — LIDOCAINE HYDROCHLORIDE AND EPINEPHRINE 15; 5 MG/ML; UG/ML
INJECTION, SOLUTION EPIDURAL
Status: COMPLETED | OUTPATIENT
Start: 2024-10-16 | End: 2024-10-16

## 2024-10-16 RX ORDER — NALBUPHINE HYDROCHLORIDE 10 MG/ML
2.5 INJECTION INTRAMUSCULAR; INTRAVENOUS; SUBCUTANEOUS
Status: DISCONTINUED | OUTPATIENT
Start: 2024-10-16 | End: 2024-10-20

## 2024-10-16 RX ORDER — PROCHLORPERAZINE EDISYLATE 5 MG/ML
5 INJECTION INTRAMUSCULAR; INTRAVENOUS ONCE AS NEEDED
Status: ACTIVE | OUTPATIENT
Start: 2024-10-16 | End: 2024-10-16

## 2024-10-16 RX ORDER — BUPIVACAINE HYDROCHLORIDE 2.5 MG/ML
20 INJECTION, SOLUTION EPIDURAL; INFILTRATION; INTRACAUDAL ONCE
Status: DISCONTINUED | OUTPATIENT
Start: 2024-10-16 | End: 2024-10-17 | Stop reason: HOSPADM

## 2024-10-16 RX ORDER — FAMOTIDINE 10 MG/ML
20 INJECTION, SOLUTION INTRAVENOUS ONCE
Status: DISCONTINUED | OUTPATIENT
Start: 2024-10-16 | End: 2024-10-17 | Stop reason: HOSPADM

## 2024-10-16 RX ORDER — FAMOTIDINE 20 MG/1
20 TABLET, FILM COATED ORAL ONCE
Status: DISCONTINUED | OUTPATIENT
Start: 2024-10-16 | End: 2024-10-17 | Stop reason: HOSPADM

## 2024-10-16 RX ADMIN — LIDOCAINE HYDROCHLORIDE 5 ML: 10 INJECTION, SOLUTION INFILTRATION; PERINEURAL at 21:38:00

## 2024-10-16 RX ADMIN — BUPIVACAINE HYDROCHLORIDE 5 ML: 2.5 INJECTION, SOLUTION EPIDURAL; INFILTRATION; INTRACAUDAL at 21:38:00

## 2024-10-16 RX ADMIN — LIDOCAINE HYDROCHLORIDE AND EPINEPHRINE 5 ML: 15; 5 INJECTION, SOLUTION EPIDURAL at 21:38:00

## 2024-10-16 RX ADMIN — SODIUM CHLORIDE: 9 INJECTION, SOLUTION INTRAVENOUS at 23:44:00

## 2024-10-16 RX ADMIN — LIDOCAINE HCL/EPINEPHRINE/PF 15 ML: 2%-1:200K VIAL (ML) INJECTION at 23:45:00

## 2024-10-16 NOTE — H&P
[facility]  Obstetrics and Gynecology  History & Physical    Kailyn Vieyra Patient Status:  Outpatient    1990 MRN [id number]   Location [facility] Attending Zunilda Valdivia MD   Hospital Day 0 PCP No primary care provider on file.     CC: Patient is here for labor    SUBJECTIVE:    Kailyn Vieyra is a 33 year old  female at 39w2d Estimated Date of Delivery: 10/21/24 who is being admitted for labor management. Her current obstetrical history is significant for no complications. Patient reports contractions starting at 0640 today.     {positive UCx.    negative VB.   negative LOF.    positive Fetal movement.   negative Nausea, Vomiting, headache, vision changes and RUQ/Epigastric pain.       MARYSOL Confirmation  LMP: Patient's last menstrual period was 01/15/2024 (exact date).  MARYSOL: 10/21/2024, by Last Menstrual Period     OB Ultrasounds:   1) OB US 24Transabdominal images taken   EFW 2348g (5lb3oz) ± 343g Fetal growth appears to be 71.1%.   MIGUEL is normal.   Placental location is Anterior   Fetal Position is Vertex     Obstetric History:   OB History    Para Term  AB Living   3 1 1   1 1   SAB IAB Ectopic Multiple Live Births   1       1      # Outcome Date GA Lbr Richard/2nd Weight Sex Type Anes PTL Lv   3 Current            2 SAB 23           1 Term 13   7 lb 1 oz (3.204 kg) M NORMAL SPONT   ESE     Past Medical History:   Past Medical History:    Allergic rhinitis    Anemia    Heart murmur    born with heart murmur unsure if was transfused blood    Rosacea     Past Social History:   Past Surgical History:   Procedure Laterality Date    Anesth,open heart surgery      8yo for heart murmur for PFO?    Colposcopy,bx cervix/endocerv curr  10/2018      2013    Remove intrauterine device       Family History:   Family History   Problem Relation Age of Onset    Diabetes Father         69    Hypertension Father     Renal  Disease Father     Hypertension Mother     Arthritis Mother     Crohn's Disease Son     Other (bladder cancer) Maternal Grandmother     Cancer Maternal Grandmother     No Known Problems Brother     No Known Problems Brother     Renal Disease Half-Brother     No Known Problems Half-Sister      Social History:   Social History     Tobacco Use    Smoking status: Never    Smokeless tobacco: Never   Substance Use Topics    Alcohol use: Not Currently       Home Meds: Prescriptions Prior to Admission[1]  Allergies: Allergies[2]    OBJECTIVE:    Pulse:  [69-81] 69  BP: (104-136)/(61-95) 110/68  There is no height or weight on file to calculate BMI.    General: AAO. NAD.   Lungs: Respirations non labored   CV: peripheral pulses +2 bilaterally   Abdomen: FHT present, gravid   Extremities: negative edema bilaterally, negative calf tenderness bilaterally    FHT: moderate variability/130 BPM / Positive accelerations/negative  decelerations   TOCO: q 4-5 minutes    SVE: 50/-3    Prenatal Labs Brief Review   Blood Type:   Lab Results   Component Value Date    ABO O 2024    RH Positive 2024     GBS:  Negative      Inpatient labs:       ASSESSMENT/ PLAN:    Kailyn Vieyra is a 33 year old  female at 39w2d Estimated Date of Delivery: 10/21/24 who is being admitted for labor management.    Patient Active Problem List   Diagnosis    Encounter for supervision of other normal pregnancy, unspecified trimester (HCC)    Normal labor (HCC)       1. Labor: expt managment   2. Fetal monitoring: CEFM  3. GBS: negative   4. Pain: ok for epidural if desires    Risks, benefits, alternatives and possible complications have been discussed in detail with the patient.  Pre-admission, admission, and post admission procedures and expectations were discussed in detail.  All questions answered, all appropriate consents signed at the Hospital. Admission is planned for today.     MD LEXI Weiss OBGYN         [1]    Medications Prior to Admission   Medication Sig Dispense Refill Last Dose/Taking    prenatal vitamin with DHA 27-0.8-228 MG Oral Cap Take 1 capsule by mouth daily.   10/15/2024   [2]   Allergies  Allergen Reactions    Cat Hair Extract OTHER (SEE COMMENTS)     Watery eyes, stuffy nose    Dander OTHER (SEE COMMENTS)     Runny nose, stuffy nose, red/ watery eyes    Pollen OTHER (SEE COMMENTS)     Itchy eyes and runny nose

## 2024-10-16 NOTE — PROGRESS NOTES
Pt is a 33 year old female admitted to TR2/TR2-A.     Chief Complaint   Patient presents with    R/o Labor     Patient states cxns since 640; breathing through, patient denies vaginal LOF or bleeding. Patient states +FM       Pt is  39w2d intra-uterine pregnancy.  History obtained, consents signed. Oriented to room, staff, and plan of care.

## 2024-10-16 NOTE — PROGRESS NOTES
S: patient tolerating labor.     O: AFVSS   Fetal heart tones 130s, moderate variability, positive accels  Category 1    SVE: 8/90/-2; AROM, thick meconium.    A/P: IUP at 39/2wks, labor. Continue expt managment  Anticipate .    Zunilda Valdivia MD

## 2024-10-17 PROCEDURE — 59514 CESAREAN DELIVERY ONLY: CPT | Performed by: OBSTETRICS & GYNECOLOGY

## 2024-10-17 RX ORDER — NALBUPHINE HYDROCHLORIDE 10 MG/ML
2.5 INJECTION INTRAMUSCULAR; INTRAVENOUS; SUBCUTANEOUS
Status: DISCONTINUED | OUTPATIENT
Start: 2024-10-17 | End: 2024-10-20

## 2024-10-17 RX ORDER — CARBOPROST TROMETHAMINE 250 UG/ML
INJECTION, SOLUTION INTRAMUSCULAR
Status: DISPENSED
Start: 2024-10-17 | End: 2024-10-17

## 2024-10-17 RX ORDER — ONDANSETRON 2 MG/ML
4 INJECTION INTRAMUSCULAR; INTRAVENOUS EVERY 6 HOURS PRN
Status: DISCONTINUED | OUTPATIENT
Start: 2024-10-17 | End: 2024-10-20

## 2024-10-17 RX ORDER — METHYLERGONOVINE MALEATE 0.2 MG/ML
INJECTION INTRAVENOUS
Status: DISPENSED
Start: 2024-10-17 | End: 2024-10-17

## 2024-10-17 RX ORDER — SIMETHICONE 80 MG
80 TABLET,CHEWABLE ORAL 3 TIMES DAILY PRN
Status: DISCONTINUED | OUTPATIENT
Start: 2024-10-17 | End: 2024-10-20

## 2024-10-17 RX ORDER — GABAPENTIN 300 MG/1
300 CAPSULE ORAL EVERY 8 HOURS PRN
Status: DISCONTINUED | OUTPATIENT
Start: 2024-10-17 | End: 2024-10-20

## 2024-10-17 RX ORDER — DOCUSATE SODIUM 100 MG/1
100 CAPSULE, LIQUID FILLED ORAL
Status: DISCONTINUED | OUTPATIENT
Start: 2024-10-17 | End: 2024-10-20

## 2024-10-17 RX ORDER — ONDANSETRON 2 MG/ML
4 INJECTION INTRAMUSCULAR; INTRAVENOUS ONCE AS NEEDED
Status: ACTIVE | OUTPATIENT
Start: 2024-10-17 | End: 2024-10-17

## 2024-10-17 RX ORDER — LIDOCAINE HCL/EPINEPHRINE/PF 2%-1:200K
VIAL (ML) INJECTION AS NEEDED
Status: DISCONTINUED | OUTPATIENT
Start: 2024-10-16 | End: 2024-10-17 | Stop reason: SURG

## 2024-10-17 RX ORDER — MISOPROSTOL 200 UG/1
TABLET ORAL
Status: DISPENSED
Start: 2024-10-17 | End: 2024-10-17

## 2024-10-17 RX ORDER — DEXAMETHASONE SODIUM PHOSPHATE 4 MG/ML
VIAL (ML) INJECTION AS NEEDED
Status: DISCONTINUED | OUTPATIENT
Start: 2024-10-17 | End: 2024-10-17 | Stop reason: SURG

## 2024-10-17 RX ORDER — TRANEXAMIC ACID 10 MG/ML
INJECTION, SOLUTION INTRAVENOUS
Status: DISPENSED
Start: 2024-10-17 | End: 2024-10-17

## 2024-10-17 RX ORDER — ACETAMINOPHEN 500 MG
1000 TABLET ORAL EVERY 6 HOURS
Status: DISCONTINUED | OUTPATIENT
Start: 2024-10-17 | End: 2024-10-20

## 2024-10-17 RX ORDER — TRANEXAMIC ACID 10 MG/ML
INJECTION, SOLUTION INTRAVENOUS AS NEEDED
Status: DISCONTINUED | OUTPATIENT
Start: 2024-10-17 | End: 2024-10-17 | Stop reason: SURG

## 2024-10-17 RX ORDER — KETOROLAC TROMETHAMINE 30 MG/ML
30 INJECTION, SOLUTION INTRAMUSCULAR; INTRAVENOUS ONCE
Status: DISCONTINUED | OUTPATIENT
Start: 2024-10-17 | End: 2024-10-17

## 2024-10-17 RX ORDER — SODIUM CHLORIDE 9 MG/ML
INJECTION, SOLUTION INTRAVENOUS CONTINUOUS PRN
Status: DISCONTINUED | OUTPATIENT
Start: 2024-10-16 | End: 2024-10-17 | Stop reason: SURG

## 2024-10-17 RX ORDER — SODIUM CHLORIDE, SODIUM LACTATE, POTASSIUM CHLORIDE, CALCIUM CHLORIDE 600; 310; 30; 20 MG/100ML; MG/100ML; MG/100ML; MG/100ML
INJECTION, SOLUTION INTRAVENOUS CONTINUOUS
Status: DISCONTINUED | OUTPATIENT
Start: 2024-10-17 | End: 2024-10-20

## 2024-10-17 RX ORDER — AMMONIA INHALANTS 0.04 G/.3ML
0.3 INHALANT RESPIRATORY (INHALATION) AS NEEDED
Status: DISCONTINUED | OUTPATIENT
Start: 2024-10-17 | End: 2024-10-20

## 2024-10-17 RX ORDER — METHYLERGONOVINE MALEATE 0.2 MG/ML
INJECTION INTRAVENOUS AS NEEDED
Status: DISCONTINUED | OUTPATIENT
Start: 2024-10-17 | End: 2024-10-17 | Stop reason: SURG

## 2024-10-17 RX ORDER — IBUPROFEN 600 MG/1
600 TABLET, FILM COATED ORAL EVERY 6 HOURS
Status: DISCONTINUED | OUTPATIENT
Start: 2024-10-18 | End: 2024-10-20

## 2024-10-17 RX ORDER — KETOROLAC TROMETHAMINE 30 MG/ML
30 INJECTION, SOLUTION INTRAMUSCULAR; INTRAVENOUS EVERY 6 HOURS
Status: COMPLETED | OUTPATIENT
Start: 2024-10-17 | End: 2024-10-17

## 2024-10-17 RX ORDER — BISACODYL 10 MG
10 SUPPOSITORY, RECTAL RECTAL ONCE AS NEEDED
Status: DISCONTINUED | OUTPATIENT
Start: 2024-10-17 | End: 2024-10-20

## 2024-10-17 RX ADMIN — TRANEXAMIC ACID 1000 MG: 10 INJECTION, SOLUTION INTRAVENOUS at 00:16:00

## 2024-10-17 RX ADMIN — METHYLERGONOVINE MALEATE 0.2 MG: 0.2 INJECTION INTRAVENOUS at 00:34:00

## 2024-10-17 RX ADMIN — DEXAMETHASONE SODIUM PHOSPHATE 4 MG: 4 MG/ML VIAL (ML) INJECTION at 00:27:00

## 2024-10-17 RX ADMIN — ONDANSETRON 4 MG: 2 INJECTION INTRAMUSCULAR; INTRAVENOUS at 00:26:00

## 2024-10-17 NOTE — PROGRESS NOTES
[facility]  Obstetrics and Gynecology    OB/GYN: Postpartum Progress Note     SUBJECTIVE:  Patient is a 33 year old  female who is s/p PLTCS. She is POD# 0.   Doing well. Noted no complaints except for general soreness. Denies fever, chills, N, V, chest pain and SOB. Bleeding has been stable.  Voiding via gr.  Passing flatus.  No Bm. Tolerating general diet. Has not ambulated yet.     OBJECTIVE:  Vitals:    10/17/24 0546 10/17/24 0600 10/17/24 1015 10/17/24 1357   BP: 114/59  111/56 108/63   Pulse: 92 100 90 91   Resp: 16  16 15   Temp: 98.8 °F (37.1 °C)  98.6 °F (37 °C) 98.5 °F (36.9 °C)   TempSrc: Oral  Oral Oral   SpO2: 97% 98% 98% 98%       Physical Exam:  Lungs:   Respirations non labored   Cardiovascular:   Peripheral pulses +2 bilaterally      General:    AAA. NAD.    Abdomen Soft, nontender, nondistended   Lochia:  appropriate   Uterine Fundus:   firm at the umbilicus   Incision:  healing well, no significant drainage, no dehiscence, no significant erythema with Tegaderm in place    DVT Evaluation:  No evidence of DVT seen on physical exam.  Negative Mary Anne's sign.  No cords or calf tenderness.  Significant calf/ankle edema bilaterally.      Urinary output is adequate. (When recorded)    Labs:  Recent Labs   Lab 10/16/24  1230   RBC 3.99   HGB 13.0   HCT 37.4   MCV 93.7   MCH 32.6   MCHC 34.8   RDW 14.2   NEPRELIM 7.38   WBC 9.6   .0       ASSESSMENT/PLAN:  Patient is a 33 year old  female who is s/p PLTCS POD# 0.     Doing well   Continue routine postpartum care  Vitals per routine   Encourage ambulation and IS use   Plan for discharge to home POD#2vs#3  Follow up in 2&6 weeks        Dr. Mathieu Mercedes MD    [facility]     This note was created by [employer] voice recognition. Errors in content may be related to improper recognition by the system; efforts to review and correct have been done but errors may still exist. Please be advised the primary purpose of this  note is for me to communicate medical care. Standard sentence structure is not always used. Medical terminology and medical abbreviations may be used. There may be grammatical, typographical, and automated fill ins that may have errors missed in proofreading.

## 2024-10-17 NOTE — PROGRESS NOTES
Decision for  section:  Patient has remained at 8-9 cm at -3 station for >4 hours with adequate contractions. Cervix edematous, but soft and stretchy. Attempt to push past the cervix not successful and baby had prolonged decels to 80 bpm for 4 min. Recommendation for  section made and patient and partner were agreeable. Repositioning improved fetal heart tones to baseline moderate variability. Risks/benefits/procedure reviewed. Consents signed.

## 2024-10-17 NOTE — DISCHARGE INSTRUCTIONS
Post  Section Home Care Instructions     We hope you were pleased with your care at [facility].  We wish you the best outcome and overall experience with the delivery of your baby.  These instructions will help to minimize pain, limit the risk for an infection, and improve the likelihood of a successful recovery.    What to Expect:  Abdominal cramping after delivery especially if you are breastfeeding.   Vaginal bleeding for about 4-6 weeks that may be followed by a yellow or white discharge for a few more weeks.  Your period will resume in approximately 6-8 weeks, unless you are breastfeeding.    If you are bottle feeding, you may notice breast engorgement in about 3 days.  Your breast may be sore and hard. Please wear a tight fitted bra or sports bra for 24-36 hours to help prevent your breast from producing milk, and use ice packs to relive any discomfort.  If you are breastfeeding, nipple dryness is very common the first few days.    Constipation is common after having a baby.  Please increase fluid and fiber in your diet.      Over-The-Counter Medication  Non-prescription anti-inflammatory medications can also help to ease the pain.  You may take Aleve, Tylenol or Ibuprofen   Colace or Metamucil for Constipation  Lanolin for dry nipples  Tucks, Witch Hazel and Epifoam for vaginal/perineum discomfort.   Drink a full glass of water with oral medication and take as directed.    Wound Care  The following instructions will promote proper healing and help to prevent infection  Please use soap and water over incision   Pat your incision dry and leave open to air if possible   If you have steri - strips, then please remove after 4-5 days from your surgery. You may remove after a shower to decrease discomfort.   Do not replace the Steri-Strips, if they come off.  If the tapes come off, leave them off and keep the incision clean.  You do not need to cover the incision or put any medications on the  incision.    Bathing/Showers  You may resume showers  No baths, swimming, hot tubs until your post-partum visit    Home Medication  Resume your home medications as instructed    Diet  Resume your normal diet    Activity  Refrain from vaginal intercourse, vaginal suppositories, tampon use or douches until after your post-partum visit  No exercising for 4 weeks  You may climb stairs minimally for the 1st week.    Do not do heavy housework for at least 2-3 weeks    Return to Work or School  You may return to work in 6-8 weeks  Contact your obstetrician’s office, if you need a medical release. ([phone])    Driving  Avoid driving for 1-2 weeks or sooner if not taking narcotics.    Follow-up Appointment with Your Obstetrician  Call your obstetrician’s office today for an appointment in four weeks.    The number is [phone].  Verify your appointment date, day, time, and location.  At your 1st post-partum office visit:  Your progress will be evaluated, findings reviewed, and any additional concerns and instructions will be discussed.    Questions or Concerns  Call your obstetrician’s office if you experience the following:  Severe pain not controlled by pain medication  Foul smelling vaginal discharge  Heavy bleeding  Shortness of breath  Fever  Redness, increased swelling or drainage from your incision  Crying and periods of sadness that prevents you from caring for yourself and your baby  Burning sensation during urination or inability to urinate  Swelling, redness or abnormal warmth to your leg/calf  Please call [phone]. If your call is made after office hours, a physician will be available to help you.  There is always a provider covering our patients.    Thank you for coming to [facility] to start your new family.  The nurses, obstetricians, and the anesthesiologists try very hard to make sure you receive the best care possible.  Your trust in them as well as us is greatly appreciated.     Thanks so much,   [facility]

## 2024-10-17 NOTE — ANESTHESIA POSTPROCEDURE EVALUATION
Patient: Kailyn Vieyra    Procedure Summary       Date: 10/16/24 Room / Location: [facility] L+D OR    Anesthesia Start:  Anesthesia Stop:     Procedure:  SECTION (Abdomen) Diagnosis:       Fetal intolerance to labor, delivered, current hospitalization ([facility])      (Fetal intolerance to labor, delivered, current hospitalization ([facility]) [O77.9])    Surgeons: Zunilda Valdivia MD Anesthesiologist: Mariana Jaramillo MD    Anesthesia Type: epidural ASA Status: 2            Anesthesia Type: epidural    Vitals Value Taken Time   BP pnd 10/17/24 0052   Temp pnd 10/17/24 0052   Pulse 132 10/17/24 0051   Resp 39 10/17/24 005   SpO2 99 % 10/17/24 0051   Vitals shown include unfiled device data.    [facility] AN Post Evaluation:   Patient Evaluated in PACU  Patient Participation: complete - patient participated  Level of Consciousness: awake  Pain Score: 2  Pain Management: adequate  Airway Patency:patent  Dental exam unchanged from preop  Yes    Nausea/Vomiting: none  Cardiovascular Status: acceptable (await bp)  Respiratory Status: acceptable  Postoperative Hydration acceptable      MARIANA JARAMILLO MD  10/17/2024 12:52 AM

## 2024-10-17 NOTE — LACTATION NOTE
This note was copied from a baby's chart.     10/17/24 1800   Evaluation Type   Evaluation Type Inpatient   Problems & Assessment   Problems: comment/detail Initial LC visit at 17 hours of age, TCB climbing   Infant Assessment Hunger cues present   Muscle tone Appropriate for GA   Feeding Assessment   Summary Current Feeding Breastfeeding with formula supplement   Last 24 hour feeding summary One supplement of 12ml given following delivery   Breastfeeding Assessment Assisted with breastfeeding w/mother's permission;Coordinated suck/swallow;Sustained nutrititive latch w/audible swallows;Sleepy infant, quickly pacifies;Tolerated feeding well;Sustained nutritive latch using nipple shield   Breastfeeding lasted # of minutes 15   Breastfeeding Positions football;left breast;right breast   Latch 2   Audible Sucks/Swallows 1   Type of Nipple 2   Comfort (Breast/Nipple) 2   Hold (Positioning) 1   LATCH Score 8   Other (comment) Minimal lactation assistance required to achieve latch. Sustained comfortable latch with intermittent active suck patterns, responds well to stimulation and breast compressions when indicated. Encouraged skin to skin when able and parent led feedings. Observed feeding on both breasts. Reviewed appropriate questions related to feedings.   Output   # Voids in 24 hours WNL   # Stools in 24 hours WNL

## 2024-10-17 NOTE — LACTATION NOTE
10/17/24 1800   Evaluation Type   Evaluation Type Inpatient   Problems identified   Problems identified Knowledge deficit   Maternal history   Maternal history Caesarean section;Anemia   Other/comment thick MSAF   Breastfeeding goal   Breastfeeding goal To maintain breast milk feeding per patient goal   Maternal Assessment   Bilateral Breasts Soft;Symmetrical   Bilateral Nipples Everted;WNL   Prior breastfeeding experience (comment below) Multip   Prior BF experience: comment has an 11 year old   Breastfeeding Assistance Breastfeeding assistance provided with permission   Pain assessment   Location/Comment denies   Guidelines for use of:   Breast pump type Hand Pump   Current use of pump: Not currently indicated but requested hand pump. Does not have a pump at home, pump in route.   Suggested use of pump For comfort as needed;Pump each time a supplement is offered;Pump if infant is not latching to breast   Other (comment) Infant sustained deep latch well to both breasts.

## 2024-10-17 NOTE — PLAN OF CARE

## 2024-10-17 NOTE — CM/SW NOTE
SW self referral due to finances/WIC resources    SW met with patient and FOB bedside.  SW confirmed face sheet contact as correct.    Baby boy/girl name:Baby boy: José  Date & time of delivery:10/17/24  Delivery method:Caesarean Section   Siblings age:11 yr old    Patient employed: Denied  Length of maternity leave:n/a    Father of baby employed:Denied  Length of paternity leave:n/a    Breast or formula feed:Breast feed    Pediatrician:Dr. Mathieu MENDOZA encouraged pt to schedule infant first appointment (usually within 48 hours of discharge) prior to pt discharge. Pt expressed understanding.     Infant Insurance:Medicaid [place] [facility] contacted:Yes    Mental Health History: Denied    Medications:n/a    Therapist:n/a    Psychiatrist:n/a    SW discussed signs, symptoms and risks associated with post partum depression & anxiety.  SW provided pt with PMAD resources.  Other resources provided:[place] and [facility] specific resources.    Patient support system:FOB    Patient denied current questions/needs from SW.    SW/CM to remain available for support and/or discharge planning.      Alta GOLDSMITH, LSW  Social Work   Ext:#[phone]          
not applicable

## 2024-10-17 NOTE — ANESTHESIA PREPROCEDURE EVALUATION
Anesthesia PreOp Note    HPI:     Kailyn Vieyra is a 33 year old female who presents for preoperative consultation requested by: * No surgeons listed *    Date of Surgery: 10/16/2024    * No procedures listed *  Indication: * No pre-op diagnosis entered *    Relevant Problems   No relevant active problems       NPO:                         History Review:  Patient Active Problem List    Diagnosis Date Noted    Normal labor ([facility]) 10/16/2024    Encounter for supervision of other normal pregnancy, unspecified trimester ([facility]) 2024       Past Medical History:    Allergic rhinitis    Anemia    Heart murmur    born with heart murmur unsure if was transfused blood    Rosacea       Past Surgical History:   Procedure Laterality Date    Anesth,open heart surgery      6yo for heart murmur for PFO?    Colposcopy,bx cervix/endocerv curr  10/2018      2013    Remove intrauterine device         Prescriptions Prior to Admission[1]  Current Medications and Prescriptions Ordered in Epic[2]    Allergies[3]    Family History   Problem Relation Age of Onset    Diabetes Father         69    Hypertension Father     Renal Disease Father     Hypertension Mother     Arthritis Mother     Crohn's Disease Son     Other (bladder cancer) Maternal Grandmother     Cancer Maternal Grandmother     No Known Problems Brother     No Known Problems Brother     Renal Disease Half-Brother     No Known Problems Half-Sister      Social History     Socioeconomic History    Marital status: Single   Tobacco Use    Smoking status: Never    Smokeless tobacco: Never   Vaping Use    Vaping status: Never Used   Substance and Sexual Activity    Alcohol use: Not Currently    Drug use: No    Sexual activity: Yes     Partners: Male     Birth control/protection: I.U.D.   Other Topics Concern    Reaction to local anesthetic No    Pt has a pacemaker No    Pt has a defibrillator No       Available pre-op labs reviewed.  Lab Results   Component Value Date     WBC 9.6 10/16/2024    RBC 3.99 10/16/2024    HGB 13.0 10/16/2024    HCT 37.4 10/16/2024    MCV 93.7 10/16/2024    MCH 32.6 10/16/2024    MCHC 34.8 10/16/2024    RDW 14.2 10/16/2024    .0 10/16/2024     Lab Results   Component Value Date     10/16/2024    K 4.2 10/16/2024     10/16/2024    CO2 20.0 (L) 10/16/2024    BUN 7 (L) 10/16/2024    CREATSERUM 0.58 10/16/2024    GLU 82 10/16/2024    CA 9.5 10/16/2024          Vital Signs:  There is no height or weight on file to calculate BMI.   oral temperature is 98.9 °F (37.2 °C). Her blood pressure is 98/53 and her pulse is 88. Her respiration is 19.   Vitals:    10/16/24 1015 10/16/24 1630 10/16/24 1937 10/16/24 1951   BP: 110/68 118/72  98/53   Pulse: 69 62  88   Resp:   19    Temp:  98.4 °F (36.9 °C) 98.9 °F (37.2 °C)    TempSrc:  Oral Oral         Anesthesia Evaluation     Patient summary reviewed and Nursing notes reviewed    Airway   Mallampati: I  TM distance: >3 FB  Neck ROM: full  Dental - Dentition appears grossly intact     Pulmonary - negative ROS and normal exam   Cardiovascular - normal exam    ROS comment: Ped heart surgery VSD?  No issues currently    Neuro/Psych - negative ROS     GI/Hepatic/Renal - negative ROS     Endo/Other - negative ROS   Abdominal  - normal exam                 Anesthesia Plan:   ASA:  2  Plan:   Epidural  Informed Consent Plan and Risks Discussed With:  Patient  Use of Blood Products Discussed With:  Patient      I have informed Kailyn Vieyra and/or legal guardian or family member of the nature of the anesthetic plan, benefits, risks including possible dental damage if relevant, major complications, and any alternative forms of anesthetic management.   All of the patient's questions were answered to the best of my ability. The patient desires the anesthetic management as planned.  MARIANA JARAMILLO MD  10/16/2024 9:54 PM  Present on Admission:   Normal labor (HCC)           [1]   Medications Prior to Admission    Medication Sig Dispense Refill Last Dose/Taking    prenatal vitamin with DHA 27-0.8-228 MG Oral Cap Take 1 capsule by mouth daily.   10/15/2024   [2]   Current Facility-Administered Medications Ordered in Epic   Medication Dose Route Frequency Provider Last Rate Last Admin    dextrose in lactated ringers 5% infusion   Intravenous Continuous Zunilda Valdivia MD        lactated ringers infusion   Intravenous PRN Zunilda Valdivia MD        lactated ringers IV bolus 500 mL  500 mL Intravenous PRN Zunilda Valdivia MD        acetaminophen (Tylenol Extra Strength) tab 500 mg  500 mg Oral Q6H PRN Zunilda Valdivia MD        acetaminophen (Tylenol Extra Strength) tab 1,000 mg  1,000 mg Oral Q6H PRN Zunilda Valdivia MD        ibuprofen (Motrin) tab 600 mg  600 mg Oral Once PRN Zunilda Valdivia MD        ondansetron (Zofran) 4 MG/2ML injection 4 mg  4 mg Intravenous Q6H PRN Zunilda Valdivia MD        oxyTOCIN in sodium chloride 0.9% (Pitocin) 30 Units/500mL infusion premix  62.5-900 ashish-units/min Intravenous Continuous Zunilda Valdivia MD        terbutaline (Brethine) 1 MG/ML injection 0.25 mg  0.25 mg Subcutaneous PRN Zunilda Valdivia MD        sodium citrate-citric acid (Bicitra) 500-334 MG/5ML oral solution 30 mL  30 mL Oral PRN Zunilda Valdivia MD        lidocaine PF (Xylocaine-MPF) 1% injection  30 mL Intradermal Once Zunilda Valdivia MD        fentaNYL (Sublimaze) 50 mcg/mL injection 50 mcg  50 mcg Intravenous Q30 Min PRN Zunilda Valdivia MD   50 mcg at 10/16/24 2030    oxyTOCIN in sodium chloride 0.9% (Pitocin) 30 Units/500mL infusion premix  0.5-20 ashish-units/min Intravenous Continuous Zunilda Valdivia MD   Stopped at 10/16/24 2000    lactated ringers IV bolus 1,000 mL  1,000 mL Intravenous Once Iron Steward MD 2,000 mL/hr at 10/16/24 2020 Rate Change at 10/16/24 2020    fentaNYL-bupivacaine 2 mcg/mL-0.125% in sodium chloride 0.9% 100 mL EPIDURAL infusion premix   Epidural  Continuous Iron Steward MD        fentaNYL (Sublimaze) 50 mcg/mL injection 100 mcg  100 mcg Epidural Once Iron Steward MD        bupivacaine PF (Marcaine) 0.25% injection  20 mL Epidural Once Iron Steward MD        EPHEDrine (PF) 25 MG/5 ML injection 5 mg  5 mg Intravenous PRN Iron Steward MD        nalbuphine (Nubain) 10 mg/mL injection 2.5 mg  2.5 mg Intravenous Q15 Min PRN Iron Steward MD         No current Epic-ordered outpatient medications on file.   [3]   Allergies  Allergen Reactions    Cat Hair Extract OTHER (SEE COMMENTS)     Watery eyes, stuffy nose    Dander OTHER (SEE COMMENTS)     Runny nose, stuffy nose, red/ watery eyes    Pollen OTHER (SEE COMMENTS)     Itchy eyes and runny nose

## 2024-10-17 NOTE — ANESTHESIA PROCEDURE NOTES
Airway  Date/Time: 10/16/2024 11:49 PM  Urgency: Elective    Airway not difficult    General Information and Staff    Patient location during procedure: OR  Anesthesiologist: Iron Steward MD  Performed: anesthesiologist   Performed by: Iron Steward MD  Authorized by: Iron Steward MD      Indications and Patient Condition  Indications for airway management: anesthesia  Sedation level: deep  Preoxygenated: yes  Patient position: sniffing  Mask difficulty assessment: 0 - not attempted    Final Airway Details  Final airway type: endotracheal airway      Successful airway: ETT  Cuffed: yes   Successful intubation technique: direct laryngoscopy  Facilitating devices/methods: cricoid pressure and rapid sequence intubation  Endotracheal tube insertion site: oral  Blade: Forrest  Blade size: #3  ETT size (mm): 7.0    Cormack-Lehane Classification: grade I - full view of glottis  Placement verified by: capnometry   Measured from: teeth  Number of attempts at approach: 1    Additional Comments  X 1 atx bbs etco2   Ogt placed

## 2024-10-17 NOTE — PROGRESS NOTES
Patient transferred to mother/baby room 377 per cart in stable condition with baby and personal belongings.  Accompanied by  and staff.  Report given to mother/baby RN Lilian.

## 2024-10-17 NOTE — ANESTHESIA PROCEDURE NOTES
Labor Analgesia    Date/Time: 10/16/2024 9:38 PM    Performed by: Iron Steward MD  Authorized by: Iron Steward MD      General Information and Staff    Start Time:  10/16/2024 9:38 PM  End Time:  10/16/2024 9:45 PM  Anesthesiologist:  Iron Steward MD  Performed by:  Anesthesiologist  Patient Location:  OB  Site Identification: surface landmarks    Reason for Block: labor epidural    Preanesthetic Checklist: patient identified, IV checked, site marked, risks and benefits discussed, monitors and equipment checked, pre-op evaluation, timeout performed, anesthesia consent and sterile technique used      Procedure Details    Patient Position:  Sitting  Prep: ChloraPrep    Monitoring:  Heart rate  Approach:  Midline    Epidural Needle    Injection Technique:  ADONIS air  Needle Type:  Tuohy  Needle Gauge:  18 G  Needle Length:  3.5 in  Location:  L2-3    Spinal Needle      Catheter    Catheter Type:  Multi-orifice  Catheter Size:  20 G  Catheter at Skin Depth:  14  Test Dose:  Negative    Assessment    Sensory Level:  T6    Additional Comments     First pass good relief motor intact

## 2024-10-17 NOTE — OPERATIVE REPORT
Jose Juan Vieyra [[id number]]      Labor Events     labor?: No   steroids?: None  Antibiotics received during labor?: No  Rupture date/time: 10/16/2024 1515     Rupture type: AROM  Fluid color: Meconium  Labor type: Spontaneous Onset of Labor  Augmentation: Oxytocin  Intrapartum & labor complications: Variable decelerations, Meconium       Labor Event Times    Decision date/time (emergent ): 10/16/2024 2338       [place] Presentation    Presentation: Vertex       Operative Delivery    Operative Vaginal Delivery: N/A                Shoulder Dystocia    Shoulder Dystocia: N/A       Anesthesia    Method: General, Epidural               Delivery      Head delivery date/time: 10/17/2024 00:01:04   Delivery date/time:  10/17/24 00:01:35   Delivery type: Caesarean Section    Details:   categorization: Primary    priority: emergency   Indications for : Fetal Intolerance of Labor   Skin incision type: 1 Pfannenstiel   Uterine Incision type: Low Transverse   Instrumented delivery: Vacuum      Delivery location: OR  Delivery Room Temperature: 73       Delivery Providers    Delivering Clinician: Zunilda Valdivia MD   Delivery personnel:  Provider Role   Rosa Sanchez, RN Baby Nurse   Maggie Sawant RN Delivery Nurse   Isabela Horne Surgical Tech   Iron Steward MD Anesthesiologist             Cord    Vessels: 3 Vessels  Complications: Knot, Nuchal  # of loops: 1  Timed cord clamping: Yes  Time in sec: 10  Cord blood disposition: to lab  Gases sent?: Yes       Resuscitation    Method: None        Measurements      Weight: 3680 g 8 lb 1.8 oz Length: 53.3 cm     Head circum.: 35.5 cm              Placenta    Date/time: 10/17/2024 0002  Removal: Manual Removal  Appearance: Intact  Disposition: Lab       Apgars    Living status: Living   Apgar Scoring Key:    0 1 2    Skin color Blue or pale Acrocyanotic Completely pink    Heart rate Absent <100 bpm >100  bpm    Reflex irritability No response Grimace Cry or active withdrawal    Muscle tone Limp Some flexion Active motion    Respiratory effort Absent Weak cry; hypoventilation Good, crying              1 Minute:  5 Minute:  10 Minute:  15 Minute:  20 Minute:      Skin color: 0  0  1      Heart rate: 1  2  2      Reflex irritablity: 1  2  2      Muscle tone: 2  2  2      Respiratory effort: 2  2  2      Total: 6  8  9         Apgars assigned by: SUSAN SAM       Skin to Skin    No data filed       Vaginal Count    No data filed       Lacerations    Episiotomy: None  Vaginal laceration?: No      Cervical laceration?: No    Clitoral laceration?: No                [facility]  [facility] of [facility]     Section Delivery / Operative Note    Kailyn Vieyra Patient Status:  Inpatient    1990 MRN [id number]   Location [facility] Attending Zunilda Valdivia MD   Hosp Day # 1 PCP No primary care provider on file.     Pre Op Diagnosis:  IUP at 39w3d, Fetal intolerance to labor, delivered, current hospitalization ([facility]) [O77.9]    Post Op Diagnosis: arrest of labor, fetal intolerance, Same - Delivered    Procedure:  primary  LTCS     Indications:  Patient is a 33 year old year old  at 39w3d who presented for labor. She progressed to 8 cm. Patient has remained at 8-9 cm at -3 station for >4 hours with adequate contractions. Cervix edematous, but soft and stretchy. Attempt to push past the cervix not successful and baby had prolonged decels to 80 bpm for 4 min. Recommendation for  section made and patient and partner were agreeable. Repositioning improved fetal heart tones to baseline moderate variability. The risks, benefits and alternatives were d/w patient including but not limited to risk of injury, infection, bleeding and subsequent  section deliveries. All questions and concerns were addressed. Patient provided verbal and written consent.      Surgeon:  Zunilda Valdivia MD    Assistant Surgeon:  Nereida Rodriguez MD     Anesthesia: epidural and general     Complications: none    Antibiotics:   ancef and azithromycin  preoperatively    QBL: TBD;  mL    IVF: 800 mL    UO: 100 mL, clear    Specimens: Placenta, cord gases and cord blood    Findings: normal uterus, normal tubes bilaterally, normal ovaries bilaterally. Live male infant, Nuchal Cord:  single nuchal, body, and true knot  Thick meconium amniotic fluid noted.    Infant:  Date of Delivery: 10/17/2024   Time of Delivery: 12:01 AM  Delivery Type: Caesarean Section    Infant Sex  Information for the patient's :  Jose Juan Vieyra [[id number]]   male    Infant Birthweight  Information for the patient's :  Jose Juan Vieyra [[id number]]   8 lb 1.8 oz (3.68 kg)     Apgars:  1 minute: 6               5 minutes: 8               Placenta  Delivery: spontaneous  Placenta to Pathology: yes    Cord:  Cord Gases Submitted: yes  Cord Blood/Tissue Collection: yes  Cord Complications: single nuchal, body, and true knot    Procedure:   After informed consent was obtained, the patient was taken to the operating room, prepped and draped in the usual sterile fashion. SCDs and a gr catheter were placed and anesthesia was found to be adequate. Two grams of ancef and azithromycin were given for infection prophylaxis. She was prepared and draped in the dorsal supine position with a leftward tilt. A time out was performed. A pfannenstiel skin incision was made and carried down to the fascia. The fascia was incised and extended laterally with Garcia scissors. The superior aspect of the fascia was grasped with kocher clamps, and the rectus muscle was dissected off bluntly then sharply with garcia scissors. In a similar fashion, the inferior aspect of the fascia was grasped with kocher clamps and the underlying rectus muscle was dissected off bluntly and then sharply with Garcia scissors. Hemostasis was achieved with  the bovie. The rectus muscle was  in the midline down to the level of the pubic symphysis. The peritoneum was entered bluntly and extended laterally. There was good visualization of the bladder.     The Yuri O retractor and bladder blade was inserted and the vesicouterine peritoneum identified. The lower uterine segment was identified. The lower uterine segment was incised with a scalpel. The amniotic sac was ruptured and thick meconium fluid noted. The incision was extended bluntly with superior and inferior traction.     The fetus was in cephalic presentation. The head was elevated out of the pelvis to the the hysterotomy site. Gentle fundal pressure was applied. Vacuum deliver performed and the infant was delivered without difficulty. Delayed cord clamping for 10 seconds. The cord was clamped and cut. The infant was handed off to the pediatrician. IV oxytocin was initiated to facilitate uterine contractions. The placenta was delivered intact with manual massage of the uterine fundus. The inside of the uterus was wiped with a lap sponge to assure complete removal of placenta membranes. The uterine incision was closed with a 0-vicryl suture in a continuous running fashion. There was a second imbricating layer using 0-vicryl suture. The uterus, tubes, and ovaries were normal appearing. The blood clots and fluid were wiped out of the abdomen and pelvis with moist laparotomy sponges. Re-inspection of the hysterotomy, peritomeum and rectus muscles was noted to be entirely hemostatic.    The fascia was closed with 0-vicryl suture in a running fashion. The subcutaneous tissue was closed with 2-0 plain catgut suture. The subcutaneous tissue was copiously irrigated and any bleeding cauterized. The skin was re-approximated with 4-0 Vicryl on Martin NeedleThe patient tolerated the procedure well. All sponge, instrument and needle counts were correct x3. The patient toelrated the procedure well and was taken to the  recovery room in a stable and satisfactory condition. The baby was in stable condition to the recovery room.     Specimen:  none    Drains: gr to dependant drainage    Condition:   stable    Complications: None; patient tolerated the procedure well.      MD LEXI Weiss

## 2024-10-18 LAB
BASOPHILS # BLD AUTO: 0.03 X10(3) UL (ref 0–0.2)
BASOPHILS NFR BLD AUTO: 0.3 %
DEPRECATED RDW RBC AUTO: 55.7 FL (ref 35.1–46.3)
EOSINOPHIL # BLD AUTO: 0.08 X10(3) UL (ref 0–0.7)
EOSINOPHIL NFR BLD AUTO: 0.8 %
ERYTHROCYTE [DISTWIDTH] IN BLOOD BY AUTOMATED COUNT: 15.5 % (ref 11–15)
HCT VFR BLD AUTO: 25.8 %
HGB BLD-MCNC: 8.8 G/DL
IMM GRANULOCYTES # BLD AUTO: 0.04 X10(3) UL (ref 0–1)
IMM GRANULOCYTES NFR BLD: 0.4 %
LYMPHOCYTES # BLD AUTO: 1.93 X10(3) UL (ref 1–4)
LYMPHOCYTES NFR BLD AUTO: 20.1 %
MCH RBC QN AUTO: 33.7 PG (ref 26–34)
MCHC RBC AUTO-ENTMCNC: 34.1 G/DL (ref 31–37)
MCV RBC AUTO: 98.9 FL
MONOCYTES # BLD AUTO: 0.64 X10(3) UL (ref 0.1–1)
MONOCYTES NFR BLD AUTO: 6.7 %
NEUTROPHILS # BLD AUTO: 6.9 X10 (3) UL (ref 1.5–7.7)
NEUTROPHILS # BLD AUTO: 6.9 X10(3) UL (ref 1.5–7.7)
NEUTROPHILS NFR BLD AUTO: 71.7 %
PLATELET # BLD AUTO: 146 10(3)UL (ref 150–450)
RBC # BLD AUTO: 2.61 X10(6)UL
WBC # BLD AUTO: 9.6 X10(3) UL (ref 4–11)

## 2024-10-18 NOTE — LACTATION NOTE
10/18/24 1555   Guidelines for use of:   Other (comment) LC assistance offered. Infant just recently fed from breast. Infant will be going under phototherapy. LC educated on jaundice and the effect on breastfeeding and feeding stamina. Per the pediatrician, they would like the baby to be supplemented after breastfeeding. LC reviewed supplementation guidelines. LC set mother up with the hospital grade pump and talked about the rental program d/t being unsure when pump will be delivered. Pumping guidelines reviewed. LC told patient to call if she needs latch assistance. All questions asnwered. Note was entered earlier and was not put into note format until the afternoon.

## 2024-10-18 NOTE — PLAN OF CARE
Problem: POSTPARTUM  Goal: Long Term Goal:Experiences normal postpartum course  Description: INTERVENTIONS:  - Assess and monitor vital signs and lab values.  - Assess fundus and lochia.  - Provide ice/sitz baths for perineum discomfort.  - Monitor healing of incision/episiotomy/laceration, and assess for signs and symptoms of infection and hematoma.  - Assess bladder function and monitor for bladder distention.  - Provide/instruct/assist with pericare as needed.  - Provide VTE prophylaxis as needed.  - Monitor bowel function.  - Encourage ambulation and provide assistance as needed.  - Assess and monitor emotional status and provide social service/psych resources as needed.  - Utilize standard precautions and use personal protective equipment as indicated. Ensure aseptic care of all intravenous lines and invasive tubes/drains.  - Obtain immunization and exposure to communicable diseases history.  Outcome: Progressing  Goal: Optimize infant feeding at the breast  Description: INTERVENTIONS:  - Initiate breast feeding within first hour after birth.   - Monitor effectiveness of current breast feeding efforts.  - Assess support systems available to mother/family.  - Identify cultural beliefs/practices regarding lactation, letdown techniques, maternal food preferences.  - Assess mother's knowledge and previous experience with breast feeding.  - Provide information as needed about early infant feeding cues (e.g., rooting, lip smacking, sucking fingers/hand) versus late cue of crying.  - Discuss/demonstrate breast feeding aids (e.g., infant sling, nursing footstool/pillows, and breast pumps).  - Encourage mother/other family members to express feelings/concerns, and actively listen.  - Educate father/SO about benefits of breast feeding and how to manage common lactation challenges.  - Recommend avoidance of specific medications or substances incompatible with breast feeding.  - Assess and monitor for signs of nipple  pain/trauma.  - Instruct and provide assistance with proper latch.  - Review techniques for milk expression (breast pumping) and storage of breast milk. Provide pumping equipment/supplies, instructions and assistance, as needed.  - Encourage rooming-in and breast feeding on demand.  - Encourage skin-to-skin contact.  - Provide LC support as needed.  - Assess for and manage engorgement.  - Provide breast feeding education handouts and information on community breast feeding support.   Outcome: Progressing  Goal: Establishment of adequate milk supply with medication/procedure interruptions  Description: INTERVENTIONS:  - Review techniques for milk expression (breast pumping).   - Provide pumping equipment/supplies, instructions, and assistance until it is safe to breastfeed infant.  Outcome: Progressing  Goal: Appropriate maternal -  bonding  Description: INTERVENTIONS:  - Assess caregiver- interactions.  - Assess caregiver's emotional status and coping mechanisms.  - Encourage caregiver to participate in  daily care.  - Assess support systems available to mother/family.  - Provide /case management support as needed.  Outcome: Progressing   Problem: Patient Centered Care  Goal: Patient preferences are identified and integrated in the patient's plan of care  Description: Interventions:  - What would you like us to know as we care for you?   - Provide timely, complete, and accurate information to patient/family  - Incorporate patient and family knowledge, values, beliefs, and cultural backgrounds into the planning and delivery of care  - Encourage patient/family to participate in care and decision-making at the level they choose  - Honor patient and family perspectives and choices  Outcome: Progressing

## 2024-10-18 NOTE — LACTATION NOTE
10/18/24 0900   Evaluation Type   Evaluation Type Inpatient   Problems identified   Problems identified Knowledge deficit   Maternal history   Maternal history Caesarean section;Anemia   Breastfeeding goal   Breastfeeding goal To maintain breast milk feeding per patient goal   Maternal Assessment   Bilateral Breasts Soft;Symmetrical   Bilateral Nipples Everted;WNL   Prior breastfeeding experience (comment below) Multip   Prior BF experience: comment has an 11 year old   Breastfeeding Assistance Pumping assistance provided with permission;Breast exam provided with permission;Hand expression provided with permission   Pain assessment   Location/Comment denies   Treatment of Sore Nipples Lanolin   Guidelines for use of:   Equipment Lanolin   Breast pump type Hand Pump;Ameda Platinum   Current use of pump:  set up double electric pump   Suggested use of pump For comfort as needed;Pump each time a supplement is offered;Pump if infant is not latching to breast   Reported pumping volumes (ml) Drops   Other (comment) LC assistance offered. Infant just recently fed from breast. Infant will be going under phototherapy. LC educated on jaundice and the effect on breastfeeding and feeding stamina. Per the pediatrician, they would like the baby to be supplemented after breastfeeding.  reviewed supplementation guidelines.  set mother up with the hospital grade pump and talked about the rental program d/t being unsure when pump will be delivered. Pumping guidelines reviewed.  told patient to call if she needs latch assistance. All questions asnwered.

## 2024-10-18 NOTE — PROGRESS NOTES
Pain well controlled. No heavy bleeding. Tolerating general diet. Ambulating well    AFEB VSS  ABD: wound cdi, soft, nontender  EXT: no calf tenderness    Hgb 8.8    POD#2 c/s for fetal intolerance. Doing well.

## 2024-10-19 NOTE — LACTATION NOTE
10/19/24 1230   Evaluation Type   Evaluation Type Inpatient   Problems identified   Problems identified Knowledge deficit   Problems Identified Other Follow up lactation visit to review/assist as needed   Maternal history   Maternal history Caesarean section;Anemia   Other/comment thick MSAF   Breastfeeding goal   Breastfeeding goal To maintain breast milk feeding per patient goal   Maternal Assessment   Breastfeeding Assistance LC assistance declined at this time   Guidelines for use of:   Breast pump type Hand Pump;Ameda Platinum   Other (comment) History of infant jaundice/phototherapy, now resolved, supplementation in place. Reviewed multiple appropriate questions relating to process of milk production due to concerns of not enough milk to meet infant needs.

## 2024-10-19 NOTE — PLAN OF CARE
Problem: GASTROINTESTINAL - ADULT  Goal: Maintains or returns to baseline bowel function  Description: INTERVENTIONS:  - Assess bowel function  - Maintain adequate hydration with IV or PO as ordered and tolerated  - Evaluate effectiveness of GI medications  - Encourage mobilization and activity  - Obtain nutritional consult as needed  - Establish a toileting routine/schedule  - Consider collaborating with pharmacy to review patient's medication profile  Outcome: Progressing     Problem: POSTPARTUM  Goal: Long Term Goal:Experiences normal postpartum course  Description: INTERVENTIONS:  - Assess and monitor vital signs and lab values.  - Assess fundus and lochia.  - Provide ice/sitz baths for perineum discomfort.  - Monitor healing of incision/episiotomy/laceration, and assess for signs and symptoms of infection and hematoma.  - Assess bladder function and monitor for bladder distention.  - Provide/instruct/assist with pericare as needed.  - Provide VTE prophylaxis as needed.  - Monitor bowel function.  - Encourage ambulation and provide assistance as needed.  - Assess and monitor emotional status and provide social service/psych resources as needed.  - Utilize standard precautions and use personal protective equipment as indicated. Ensure aseptic care of all intravenous lines and invasive tubes/drains.  - Obtain immunization and exposure to communicable diseases history.  Outcome: Progressing  Goal: Optimize infant feeding at the breast  Description: INTERVENTIONS:  - Initiate breast feeding within first hour after birth.   - Monitor effectiveness of current breast feeding efforts.  - Assess support systems available to mother/family.  - Identify cultural beliefs/practices regarding lactation, letdown techniques, maternal food preferences.  - Assess mother's knowledge and previous experience with breast feeding.  - Provide information as needed about early infant feeding cues (e.g., rooting, lip smacking, sucking  fingers/hand) versus late cue of crying.  - Discuss/demonstrate breast feeding aids (e.g., infant sling, nursing footstool/pillows, and breast pumps).  - Encourage mother/other family members to express feelings/concerns, and actively listen.  - Educate father/SO about benefits of breast feeding and how to manage common lactation challenges.  - Recommend avoidance of specific medications or substances incompatible with breast feeding.  - Assess and monitor for signs of nipple pain/trauma.  - Instruct and provide assistance with proper latch.  - Review techniques for milk expression (breast pumping) and storage of breast milk. Provide pumping equipment/supplies, instructions and assistance, as needed.  - Encourage rooming-in and breast feeding on demand.  - Encourage skin-to-skin contact.  - Provide LC support as needed.  - Assess for and manage engorgement.  - Provide breast feeding education handouts and information on community breast feeding support.   Outcome: Progressing  Goal: Establishment of adequate milk supply with medication/procedure interruptions  Description: INTERVENTIONS:  - Review techniques for milk expression (breast pumping).   - Provide pumping equipment/supplies, instructions, and assistance until it is safe to breastfeed infant.  Outcome: Progressing  Goal: Experiences normal breast weaning course  Description: INTERVENTIONS:  - Assess for and manage engorgement.  - Instruct on breast care.  - Provide comfort measures.  Outcome: Progressing  Goal: Appropriate maternal -  bonding  Description: INTERVENTIONS:  - Assess caregiver- interactions.  - Assess caregiver's emotional status and coping mechanisms.  - Encourage caregiver to participate in  daily care.  - Assess support systems available to mother/family.  - Provide /case management support as needed.  Outcome: Progressing

## 2024-10-19 NOTE — PROGRESS NOTES
[facility]  Obstetrics and Gynecology    OB/GYN: Postpartum Progress Note     SUBJECTIVE:  Patient is a 33 year old  female who is s/p PLTCS. She is POD# 2.   Doing well. Noted no complaints except for general soreness. Denies fever, chills, N, V, chest pain and SOB. Bleeding has been stable.  Passing flatus.   Tolerating general diet. Has not ambulated yet.     OBJECTIVE:  Vitals:    10/17/24 2351 10/18/24 0857 10/18/24 2234 10/19/24 0807   BP: 117/64 105/70 116/72 126/86   Pulse: 79 72 86 73   Resp: 16 15 16 16   Temp: 97.6 °F (36.4 °C) 98.2 °F (36.8 °C) 98.5 °F (36.9 °C) 97.7 °F (36.5 °C)   TempSrc: Oral Oral Oral Oral   SpO2:           Physical Exam:  Lungs:   Respirations non labored   Cardiovascular:   Peripheral pulses +2 bilaterally      General:    AAA. NAD.    Abdomen Soft, nontender, nondistended   Lochia:  appropriate   Uterine Fundus:   firm at the umbilicus   Incision:  healing well, no significant drainage, no dehiscence, no significant erythema with Tegaderm in place    DVT Evaluation:  No evidence of DVT seen on physical exam.  Negative Mary Anne's sign.  No cords or calf tenderness.  Significant calf/ankle edema bilaterally.      Urinary output is adequate. (When recorded)    Labs:  Recent Labs   Lab 10/18/24  0545   RBC 2.61*   HGB 8.8*   HCT 25.8*   MCV 98.9   MCH 33.7   MCHC 34.1   RDW 15.5*   NEPRELIM 6.90   WBC 9.6   .0*       ASSESSMENT/PLAN:  Patient is a 33 year old  female who is s/p PLTCS POD# 2.     Doing well   Continue routine postpartum care  Vitals per routine   Encourage ambulation and IS use   Plan for discharge to home likely tomorrow.    The patient's mother desires circumcision.   Mother understands there is no medical indication for circumcision. We discussed AAP opinion on procedure as well. She was consented for infant circumcision risks including, but not limited to: bleeding, infection, trauma to other tissue, and need for further  procedures.  The mother expressed understanding, questions were answered and she  wishes to proceed with the procedure for her son.    MD LEXI Weiss

## 2024-10-19 NOTE — PLAN OF CARE
Problem: Patient Centered Care  Goal: Patient preferences are identified and integrated in the patient's plan of care  Description: Interventions:  - What would you like us to know as we care for you?   - Provide timely, complete, and accurate information to patient/family  - Incorporate patient and family knowledge, values, beliefs, and cultural backgrounds into the planning and delivery of care  - Encourage patient/family to participate in care and decision-making at the level they choose  - Honor patient and family perspectives and choices  Outcome: Progressing     Problem: Patient/Family Goals  Goal: Patient/Family Long Term Goal  Description: Patient's Long Term Goal:     Interventions:  -   - See additional Care Plan goals for specific interventions  Outcome: Progressing  Goal: Patient/Family Short Term Goal  Description: Patient's Short Term Goal:     Interventions:   -   - See additional Care Plan goals for specific interventions  Outcome: Progressing     Problem: GASTROINTESTINAL - ADULT  Goal: Minimal or absence of nausea and vomiting  Description: INTERVENTIONS:  - Maintain adequate hydration with IV or PO as ordered and tolerated  - Nasogastric tube to low intermittent suction as ordered  - Evaluate effectiveness of ordered antiemetic medications  - Provide nonpharmacologic comfort measures as appropriate  - Advance diet as tolerated, if ordered  - Obtain nutritional consult as needed  - Evaluate fluid balance  Outcome: Progressing  Goal: Maintains or returns to baseline bowel function  Description: INTERVENTIONS:  - Assess bowel function  - Maintain adequate hydration with IV or PO as ordered and tolerated  - Evaluate effectiveness of GI medications  - Encourage mobilization and activity  - Obtain nutritional consult as needed  - Establish a toileting routine/schedule  - Consider collaborating with pharmacy to review patient's medication profile  Outcome: Progressing  Goal: Maintains adequate  nutritional intake (undernourished)  Description: INTERVENTIONS:  - Monitor percentage of each meal consumed  - Identify factors contributing to decreased intake, treat as appropriate  - Assist with meals as needed  - Monitor I&O, WT and lab values  - Obtain nutritional consult as needed  - Optimize oral hygiene and moisture  - Encourage food from home; allow for food preferences  - Enhance eating environment  Outcome: Progressing  Goal: Achieves appropriate nutritional intake (bariatric)  Description: INTERVENTIONS:  - Monitor for over-consumption  - Identify factors contributing to increased intake, treat as appropriate  - Monitor I&O, WT and lab values  - Obtain nutritional consult as needed  - Evaluate psychosocial factors contributing to over-consumption  Outcome: Progressing     Problem: POSTPARTUM  Goal: Long Term Goal:Experiences normal postpartum course  Description: INTERVENTIONS:  - Assess and monitor vital signs and lab values.  - Assess fundus and lochia.  - Provide ice/sitz baths for perineum discomfort.  - Monitor healing of incision/episiotomy/laceration, and assess for signs and symptoms of infection and hematoma.  - Assess bladder function and monitor for bladder distention.  - Provide/instruct/assist with pericare as needed.  - Provide VTE prophylaxis as needed.  - Monitor bowel function.  - Encourage ambulation and provide assistance as needed.  - Assess and monitor emotional status and provide social service/psych resources as needed.  - Utilize standard precautions and use personal protective equipment as indicated. Ensure aseptic care of all intravenous lines and invasive tubes/drains.  - Obtain immunization and exposure to communicable diseases history.  Outcome: Progressing  Goal: Optimize infant feeding at the breast  Description: INTERVENTIONS:  - Initiate breast feeding within first hour after birth.   - Monitor effectiveness of current breast feeding efforts.  - Assess support systems  available to mother/family.  - Identify cultural beliefs/practices regarding lactation, letdown techniques, maternal food preferences.  - Assess mother's knowledge and previous experience with breast feeding.  - Provide information as needed about early infant feeding cues (e.g., rooting, lip smacking, sucking fingers/hand) versus late cue of crying.  - Discuss/demonstrate breast feeding aids (e.g., infant sling, nursing footstool/pillows, and breast pumps).  - Encourage mother/other family members to express feelings/concerns, and actively listen.  - Educate father/SO about benefits of breast feeding and how to manage common lactation challenges.  - Recommend avoidance of specific medications or substances incompatible with breast feeding.  - Assess and monitor for signs of nipple pain/trauma.  - Instruct and provide assistance with proper latch.  - Review techniques for milk expression (breast pumping) and storage of breast milk. Provide pumping equipment/supplies, instructions and assistance, as needed.  - Encourage rooming-in and breast feeding on demand.  - Encourage skin-to-skin contact.  - Provide LC support as needed.  - Assess for and manage engorgement.  - Provide breast feeding education handouts and information on community breast feeding support.   Outcome: Progressing  Goal: Establishment of adequate milk supply with medication/procedure interruptions  Description: INTERVENTIONS:  - Review techniques for milk expression (breast pumping).   - Provide pumping equipment/supplies, instructions, and assistance until it is safe to breastfeed infant.  Outcome: Progressing  Goal: Experiences normal breast weaning course  Description: INTERVENTIONS:  - Assess for and manage engorgement.  - Instruct on breast care.  - Provide comfort measures.  Outcome: Progressing  Goal: Appropriate maternal -  bonding  Description: INTERVENTIONS:  - Assess caregiver- interactions.  - Assess caregiver's emotional  status and coping mechanisms.  - Encourage caregiver to participate in  daily care.  - Assess support systems available to mother/family.  - Provide /case management support as needed.  Outcome: Progressing    VSS, afebrile. Pt with intermittent pain relieved with Tylenol, Gabapentin and Motrin. Lungs clear. BS active. No BM yet. Voiding freely. Per pt passing gas. Incision with dermabond and tegaderm dry and intact. Fundus is firm, U/U, bleeding is small. Plan of care reviewed with pt. Questions and concerns answered. Will continue with plan of care.

## 2024-10-20 VITALS
DIASTOLIC BLOOD PRESSURE: 73 MMHG | RESPIRATION RATE: 16 BRPM | OXYGEN SATURATION: 97 % | SYSTOLIC BLOOD PRESSURE: 122 MMHG | HEART RATE: 63 BPM | TEMPERATURE: 99 F

## 2024-10-20 PROBLEM — D62 POSTOPERATIVE ANEMIA DUE TO ACUTE BLOOD LOSS: Status: ACTIVE | Noted: 2024-10-20

## 2024-10-20 RX ORDER — IBUPROFEN 600 MG/1
600 TABLET, FILM COATED ORAL EVERY 6 HOURS
Qty: 40 TABLET | Refills: 0 | Status: SHIPPED | OUTPATIENT
Start: 2024-10-20

## 2024-10-20 RX ORDER — PSEUDOEPHEDRINE HCL 30 MG
100 TABLET ORAL 2 TIMES DAILY PRN
Qty: 20 CAPSULE | Refills: 0 | Status: SHIPPED | OUTPATIENT
Start: 2024-10-20

## 2024-10-20 RX ORDER — ACETAMINOPHEN 500 MG
1000 TABLET ORAL EVERY 6 HOURS
Qty: 40 TABLET | Refills: 0 | Status: SHIPPED | OUTPATIENT
Start: 2024-10-20

## 2024-10-20 RX ORDER — FERROUS SULFATE 325(65) MG
325 TABLET, DELAYED RELEASE (ENTERIC COATED) ORAL 2 TIMES DAILY
Qty: 240 TABLET | Refills: 0 | Status: SHIPPED | OUTPATIENT
Start: 2024-10-20

## 2024-10-20 RX ORDER — GABAPENTIN 300 MG/1
300 CAPSULE ORAL EVERY 8 HOURS PRN
Qty: 30 CAPSULE | Refills: 0 | Status: SHIPPED | OUTPATIENT
Start: 2024-10-20

## 2024-10-20 NOTE — PROGRESS NOTES
Discharge order received from MD.     Discharge instructions and medications reviewed with patient. Follow up instructions with OB given. Mother verbalizes understanding of instructions and is in stable condition. Mom discharged from system, remaining with infant in room as infant is on phototherapy.

## 2024-10-20 NOTE — DISCHARGE SUMMARY
[facility]  part of [facility]    Discharge Summary    Kailyn Vieyra Patient Status:  Inpatient    1990 MRN [id number]   Location [facility] 3SE Attending Zunilda Valdivia MD   Hosp Day # 4 PCP No primary care provider on file.     Date of Admission: 10/16/2024    Date of Discharge: 10/20/24     Admission Diagnoses: pregnancy  Normal labor (HCC) at 39w3d     Secondary Diagnosis: postop anemia, fetal intolerance to labor    Primary OB Clinician: [facility] Course:     EDC: Estimated Date of Delivery: 10/21/24    Gestational Age: 39w3d    Date of Delivery: 10/17/24    Antepartum complications: none    Delivered By:  Dr. Zunilda Valdivia     Delivery Type: Primary  section     Tubal Ligation: n/a    Baby: Liveborn male,     Apgars:  1 minute:   6                 5 minutes: 8                        10 minutes: 9     Anesthesia: epidural and general      Surgical Procedures       Case IDs Date Procedure Surgeon Location Status    [id number] 10/16/24  SECTION Zunilda Valdivia MD [facility] L[facility]D OR [facility]            Intrapartum Complications: Failure to Progress and Non-reassuring Fetal Status    Laceration: n/a    Episiotomy: none    Placenta: spontaneous    Feeding Method: breast fed and bottle fed     Rh Immune Globulin Given: no    Rubella Vaccine Given: no    Patient is a 33 year old year old  at 39w3d who presented for labor. She progressed to 8 cm. Patient has remained at 8-9 cm at -3 station for >4 hours with adequate contractions. Cervix edematous, but soft and stretchy. Attempt to push past the cervix not successful and baby had prolonged decels to 80 bpm for 4 min. Recommendation for  section made. The  section was uncomplicated. Patient's postop course was uncomplicated. She was medically stable for discharge on POD#3.    Discharge Plan:   Discharge Condition: Good  Early Discharge:  NO    Discharge medications:  Current  Discharge Medication List        New Orders    Details   ferrous sulfate 325 (65 FE) MG Oral Tab EC Take 1 tablet (325 mg total) by mouth in the morning and 1 tablet (325 mg total) before bedtime.      acetaminophen 500 MG Oral Tab Take 2 tablets (1,000 mg total) by mouth every 6 (six) hours.      docusate sodium 100 MG Oral Cap Take 100 mg by mouth 2 (two) times daily as needed for constipation.      gabapentin 300 MG Oral Cap Take 1 capsule (300 mg total) by mouth every 8 (eight) hours as needed (For breakthrough moderate pain).      ibuprofen 600 MG Oral Tab Take 1 tablet (600 mg total) by mouth every 6 (six) hours.           Home Meds - Unchanged    Details   prenatal vitamin with DHA 27-0.8-228 MG Oral Cap Take 1 capsule by mouth daily.                   Discharge Diet: As tolerated and General diet    Discharge Activity: Pelvic rest until cleared No heavy lifting more than 20# for 8 weeks.     Follow up:      Follow-up Information       [facility]. Call.    Specialty: Pediatrics  Why: As needed  Contact information:  [address] [facility] [id number] [phone]  Additional information:  Masks are optional for all patients and visitors, unless otherwise indicated.             Zunilda Valdivia MD. Go in 2 week(s).    Specialty: OBSTETRICS & GYNECOLOGY  Why: postop visit  Contact information:  [address] [place] [id number] [phone]                             Follow up Labs: None         Other Discharge Instructions:           Post  Section Home Care Instructions     We hope you were pleased with your care at [facility].  We wish you the best outcome and overall experience with the delivery of your baby.  These instructions will help to minimize pain, limit the risk for an infection, and improve the likelihood of a successful recovery.    What to Expect:  Abdominal cramping after delivery especially if you are breastfeeding.    Vaginal bleeding for about 4-6 weeks that may be followed by a yellow or white discharge for a few more weeks.  Your period will resume in approximately 6-8 weeks, unless you are breastfeeding.    If you are bottle feeding, you may notice breast engorgement in about 3 days.  Your breast may be sore and hard. Please wear a tight fitted bra or sports bra for 24-36 hours to help prevent your breast from producing milk, and use ice packs to relive any discomfort.  If you are breastfeeding, nipple dryness is very common the first few days.    Constipation is common after having a baby.  Please increase fluid and fiber in your diet.      Over-The-Counter Medication  Non-prescription anti-inflammatory medications can also help to ease the pain.  You may take Aleve, Tylenol or Ibuprofen   Colace or Metamucil for Constipation  Lanolin for dry nipples  Tucks, Witch Hazel and Epifoam for vaginal/perineum discomfort.   Drink a full glass of water with oral medication and take as directed.    Wound Care  The following instructions will promote proper healing and help to prevent infection  Please use soap and water over incision   Pat your incision dry and leave open to air if possible   If you have steri - strips, then please remove after 4-5 days from your surgery. You may remove after a shower to decrease discomfort.   Do not replace the Steri-Strips, if they come off.  If the tapes come off, leave them off and keep the incision clean.  You do not need to cover the incision or put any medications on the incision.    Bathing/Showers  You may resume showers  No baths, swimming, hot tubs until your post-partum visit    Home Medication  Resume your home medications as instructed    Diet  Resume your normal diet    Activity  Refrain from vaginal intercourse, vaginal suppositories, tampon use or douches until after your post-partum visit  No exercising for 4 weeks  You may climb stairs minimally for the 1st week.    Do not do  heavy housework for at least 2-3 weeks    Return to Work or School  You may return to work in 6-8 weeks  Contact your obstetrician’s office, if you need a medical release. ([phone])    Driving  Avoid driving for 1-2 weeks or sooner if not taking narcotics.    Follow-up Appointment with Your Obstetrician  Call your obstetrician’s office today for an appointment in four weeks.    The number is [phone].  Verify your appointment date, day, time, and location.  At your 1st post-partum office visit:  Your progress will be evaluated, findings reviewed, and any additional concerns and instructions will be discussed.    Questions or Concerns  Call your obstetrician’s office if you experience the following:  Severe pain not controlled by pain medication  Foul smelling vaginal discharge  Heavy bleeding  Shortness of breath  Fever  Redness, increased swelling or drainage from your incision  Crying and periods of sadness that prevents you from caring for yourself and your baby  Burning sensation during urination or inability to urinate  Swelling, redness or abnormal warmth to your leg/calf  Please call [phone]. If your call is made after office hours, a physician will be available to help you.  There is always a provider covering our patients.    Thank you for coming to [facility] to start your new family.  The nurses, obstetricians, and the anesthesiologists try very hard to make sure you receive the best care possible.  Your trust in them as well as us is greatly appreciated.    Thanks so much,   [facility]          Zunilda Valdivia MD    [facility]

## 2024-10-20 NOTE — PLAN OF CARE
Problem: GASTROINTESTINAL - ADULT  Goal: Maintains or returns to baseline bowel function  Description: INTERVENTIONS:  - Assess bowel function  - Maintain adequate hydration with IV or PO as ordered and tolerated  - Evaluate effectiveness of GI medications  - Encourage mobilization and activity  - Obtain nutritional consult as needed  - Establish a toileting routine/schedule  - Consider collaborating with pharmacy to review patient's medication profile  Outcome: Progressing     Problem: POSTPARTUM  Goal: Long Term Goal:Experiences normal postpartum course  Description: INTERVENTIONS:  - Assess and monitor vital signs and lab values.  - Assess fundus and lochia.  - Provide ice/sitz baths for perineum discomfort.  - Monitor healing of incision/episiotomy/laceration, and assess for signs and symptoms of infection and hematoma.  - Assess bladder function and monitor for bladder distention.  - Provide/instruct/assist with pericare as needed.  - Provide VTE prophylaxis as needed.  - Monitor bowel function.  - Encourage ambulation and provide assistance as needed.  - Assess and monitor emotional status and provide social service/psych resources as needed.  - Utilize standard precautions and use personal protective equipment as indicated. Ensure aseptic care of all intravenous lines and invasive tubes/drains.  - Obtain immunization and exposure to communicable diseases history.  Outcome: Progressing  Goal: Optimize infant feeding at the breast  Description: INTERVENTIONS:  - Initiate breast feeding within first hour after birth.   - Monitor effectiveness of current breast feeding efforts.  - Assess support systems available to mother/family.  - Identify cultural beliefs/practices regarding lactation, letdown techniques, maternal food preferences.  - Assess mother's knowledge and previous experience with breast feeding.  - Provide information as needed about early infant feeding cues (e.g., rooting, lip smacking, sucking  fingers/hand) versus late cue of crying.  - Discuss/demonstrate breast feeding aids (e.g., infant sling, nursing footstool/pillows, and breast pumps).  - Encourage mother/other family members to express feelings/concerns, and actively listen.  - Educate father/SO about benefits of breast feeding and how to manage common lactation challenges.  - Recommend avoidance of specific medications or substances incompatible with breast feeding.  - Assess and monitor for signs of nipple pain/trauma.  - Instruct and provide assistance with proper latch.  - Review techniques for milk expression (breast pumping) and storage of breast milk. Provide pumping equipment/supplies, instructions and assistance, as needed.  - Encourage rooming-in and breast feeding on demand.  - Encourage skin-to-skin contact.  - Provide LC support as needed.  - Assess for and manage engorgement.  - Provide breast feeding education handouts and information on community breast feeding support.   Outcome: Progressing  Goal: Establishment of adequate milk supply with medication/procedure interruptions  Description: INTERVENTIONS:  - Review techniques for milk expression (breast pumping).   - Provide pumping equipment/supplies, instructions, and assistance until it is safe to breastfeed infant.  Outcome: Progressing  Goal: Experiences normal breast weaning course  Description: INTERVENTIONS:  - Assess for and manage engorgement.  - Instruct on breast care.  - Provide comfort measures.  Outcome: Progressing  Goal: Appropriate maternal -  bonding  Description: INTERVENTIONS:  - Assess caregiver- interactions.  - Assess caregiver's emotional status and coping mechanisms.  - Encourage caregiver to participate in  daily care.  - Assess support systems available to mother/family.  - Provide /case management support as needed.  Outcome: Progressing    VSS, afebrile. Pt with intermittent pain relieved with Gabapentin, Tylenol and  Motrin. Lungs clear. BS active. No BM yet. Voiding freely. Per pt passing gas. Incision with dermabond and tegaderm dry and intact. Fundus is firm, U/U, bleeding is scant. Plan of care reviewed with pt. Paperwork done. Questions and concerns answered. Anticipating discharge. Will continue with plan of care.

## 2024-11-05 ENCOUNTER — POSTPARTUM (OUTPATIENT)
Dept: OBGYN CLINIC | Facility: CLINIC | Age: 34
End: 2024-11-05
Payer: MEDICAID

## 2024-11-05 VITALS
BODY MASS INDEX: 31.1 KG/M2 | SYSTOLIC BLOOD PRESSURE: 110 MMHG | HEIGHT: 62 IN | WEIGHT: 169 LBS | DIASTOLIC BLOOD PRESSURE: 62 MMHG

## 2024-11-05 DIAGNOSIS — Z48.89 ENCOUNTER FOR POST SURGICAL WOUND CHECK: Primary | ICD-10-CM

## 2024-11-05 DIAGNOSIS — Z30.09 FAMILY PLANNING COUNSELING: ICD-10-CM

## 2024-11-05 PROCEDURE — 99024 POSTOP FOLLOW-UP VISIT: CPT | Performed by: OBSTETRICS & GYNECOLOGY

## 2024-11-05 NOTE — PROGRESS NOTES
OB Postpartum Wound Check    Chief Complaint   Patient presents with    Postpartum Care      10/17/2024         S: 33 year old   here for wound check, s/p  section on 10/16/24 for  arrest of labor, fetal intolerance.  Obstetrical  history is significant for no complications   Patient with uncomplicated postpartum course.  Denies fevers, chills, nausea, vomiting, constipation, bladder sx.  Lochia slowed.   for 2 weeks, but had low production. Bottlefeeding.  Pain controlled.    Physical Exam  Vitals:    24 1547   BP: 110/62      Gen - alert and oriented, comfortable  Abd - soft, non-tender, non-distended.  Incision - Phannensteil, clean, well approximated, no erythema, no discharge, nontender  Ext - no c/c/e  Skin - warm, dry no rash.    Assessment and Plan  1. Encounter for post surgical wound check  Healing well    2. Family planning counseling  I reviewed options with patient for contraception including all methods (OCPs, DMPA, patch, ring, condoms, IUDs, Implants, and sterilization).  Discussed efficacy of each, benefits and risks of methods, typical side effects, mechanism of action, and how to use methods.  I reviewed that no method is 100% effective and that failures do occur.  Patient is most interested in Mirena for now. Can place at postpartum visit.  Provided patient with information about this method and patient will call and return if placement is desired.      Follow up 4 weeks for routine postpartum visit.  Zunilad Valdivia MD

## 2024-11-12 ENCOUNTER — TELEPHONE (OUTPATIENT)
Dept: OTOLARYNGOLOGY | Facility: CLINIC | Age: 34
End: 2024-11-12

## 2024-11-13 ENCOUNTER — TELEPHONE (OUTPATIENT)
Dept: OBGYN UNIT | Facility: HOSPITAL | Age: 34
End: 2024-11-13

## 2024-11-13 NOTE — TELEPHONE ENCOUNTER
Left voice message for patient to schedule an in office appointment with Dr. Rojas to discuss surgery prior to scheduling.

## 2024-11-19 ENCOUNTER — OFFICE VISIT (OUTPATIENT)
Dept: OTOLARYNGOLOGY | Facility: CLINIC | Age: 34
End: 2024-11-19
Payer: MEDICAID

## 2024-11-19 DIAGNOSIS — L91.0 KELOID: ICD-10-CM

## 2024-11-19 DIAGNOSIS — J34.89 NASAL OBSTRUCTION: ICD-10-CM

## 2024-11-19 DIAGNOSIS — J34.2 DEVIATED NASAL SEPTUM: Primary | ICD-10-CM

## 2024-11-19 PROCEDURE — 99213 OFFICE O/P EST LOW 20 MIN: CPT | Performed by: OTOLARYNGOLOGY

## 2024-11-19 RX ORDER — MONTELUKAST SODIUM 10 MG/1
10 TABLET ORAL NIGHTLY
Qty: 30 TABLET | Refills: 3 | Status: SHIPPED | OUTPATIENT
Start: 2024-11-19

## 2024-11-19 RX ORDER — AZELASTINE 1 MG/ML
2 SPRAY, METERED NASAL 2 TIMES DAILY
Qty: 30 ML | Refills: 3 | Status: SHIPPED | OUTPATIENT
Start: 2024-11-19

## 2024-11-19 RX ORDER — LORATADINE 10 MG/1
10 TABLET ORAL DAILY
Qty: 30 TABLET | Refills: 3 | Status: SHIPPED | OUTPATIENT
Start: 2024-11-19

## 2024-11-19 NOTE — PROGRESS NOTES
Kailyn Vieyra is a 33 year old female.    Chief Complaint   Patient presents with    Follow - Up     Patient is here due to deviated nasal septum and keloid, patient is hereto discuss surgical intervention        HISTORY OF PRESENT ILLNESS  She presents with a 1 year history of a right posterior earlobe keloid.  States that she had a piercing at that site remove the piercing and developed a keloid subsequent to that.  It has been growing over the last several months.  Also complains of chronic nasal obstruction most of her life.  States that she has been a chronic mouth breather perhaps slightly worse during her current pregnancy of 2 months.      24 not delivered by  doing much better at this time with no real significant pain or discomfort.  Baby is-month-old and she is not breast-feeding or pumping to feed by bottle.  Baby is essentially just taking formula.  Since the delivery of her son she has noted some improvement in her congestive issues but not to the point where she feels comfortable breathing.  Previously noted to have a deviated septum and significant nasal mucosal congestion.  She has trialed fluticasone and allergy meds before she was pregnant and they rarely ever helped her very much.  No other signs, symptoms or complaints at this time.      Social History     Socioeconomic History    Marital status: Single   Tobacco Use    Smoking status: Never    Smokeless tobacco: Never   Vaping Use    Vaping status: Never Used   Substance and Sexual Activity    Alcohol use: Not Currently    Drug use: No    Sexual activity: Yes     Partners: Male     Birth control/protection: I.U.D.   Other Topics Concern    Reaction to local anesthetic No    Pt has a pacemaker No    Pt has a defibrillator No       Family History   Problem Relation Age of Onset    Diabetes Father         69    Hypertension Father     Renal Disease Father     Hypertension Mother     Arthritis Mother     Crohn's Disease Son      Other (bladder cancer) Maternal Grandmother     Cancer Maternal Grandmother     No Known Problems Brother     No Known Problems Brother     Renal Disease Half-Brother     No Known Problems Half-Sister        Past Medical History:    Allergic rhinitis    Anemia    Heart murmur    born with heart murmur unsure if was transfused blood    Rosacea       Past Surgical History:   Procedure Laterality Date    Anesth,open heart surgery      8yo for heart murmur for PFO?          Colposcopy,bx cervix/endocerv curr  10/2018      2013    Remove intrauterine device           REVIEW OF SYSTEMS    System Neg/Pos Details   Constitutional Negative Fatigue, fever and weight loss.   ENMT Negative Drooling.   Eyes Negative Blurred vision and vision changes.   Respiratory Negative Dyspnea and wheezing.   Cardio Negative Chest pain, irregular heartbeat/palpitations and syncope.   GI Negative Abdominal pain and diarrhea.   Endocrine Negative Cold intolerance and heat intolerance.   Neuro Negative Tremors.   Psych Negative Anxiety and depression.   Integumentary Negative Frequent skin infections, pigment change and rash.   Hema/Lymph Negative Easy bleeding and easy bruising.           PHYSICAL EXAM    LMP 01/15/2024 (Exact Date)        Constitutional Normal Overall appearance - Normal.   Psychiatric Normal Orientation - Oriented to time, place, person & situation. Appropriate mood and affect.   Neck Exam Normal Inspection - Normal. Palpation - Normal. Parotid gland - Normal. Thyroid gland - Normal.   Eyes Normal Conjunctiva - Right: Normal, Left: Normal. Pupil - Right: Normal, Left: Normal. Fundus - Right: Normal, Left: Normal.   Neurological Normal Memory - Normal. Cranial nerves - Cranial nerves II through XII grossly intact.   Head/Face Normal Facial features - Normal. Eyebrows - Normal. Skull - Normal.        Nasopharynx Normal External nose - Normal. Lips/teeth/gums - Normal. Tonsils - Normal. Oropharynx - Normal.    Ears Normal Inspection - Right: Normal, Left: Normal. Canal - Right: Normal, Left: Normal. TM - Right: Normal, Left: Normal.   Skin Normal Inspection - Normal.        Lymph Detail Normal Submental. Submandibular. Anterior cervical. Posterior cervical. Supraclavicular.        Nose/Mouth/Throat Normal External nose - Normal. Lips/teeth/gums - Normal. Tonsils - Normal. Oropharynx - Normal.   Nose/Mouth/Throat Normal Nares - Right: Normal Left: Normal. Septum -S shaped deviation turbinates - Right: Moderate hypertrophy left: Moderate hypertrophy       Current Outpatient Medications:     montelukast 10 MG Oral Tab, Take 1 tablet (10 mg total) by mouth nightly., Disp: 30 tablet, Rfl: 3    loratadine 10 MG Oral Tab, Take 1 tablet (10 mg total) by mouth daily., Disp: 30 tablet, Rfl: 3    azelastine 0.1 % Nasal Solution, 2 sprays by Nasal route 2 (two) times daily., Disp: 30 mL, Rfl: 3    ferrous sulfate 325 (65 FE) MG Oral Tab EC, Take 1 tablet (325 mg total) by mouth in the morning and 1 tablet (325 mg total) before bedtime., Disp: 240 tablet, Rfl: 0    acetaminophen 500 MG Oral Tab, Take 2 tablets (1,000 mg total) by mouth every 6 (six) hours., Disp: 40 tablet, Rfl: 0    prenatal vitamin with DHA 27-0.8-228 MG Oral Cap, Take 1 capsule by mouth daily., Disp: , Rfl:     docusate sodium 100 MG Oral Cap, Take 100 mg by mouth 2 (two) times daily as needed for constipation. (Patient not taking: Reported on 11/5/2024), Disp: 20 capsule, Rfl: 0    gabapentin 300 MG Oral Cap, Take 1 capsule (300 mg total) by mouth every 8 (eight) hours as needed (For breakthrough moderate pain). (Patient not taking: Reported on 11/5/2024), Disp: 30 capsule, Rfl: 0    ibuprofen 600 MG Oral Tab, Take 1 tablet (600 mg total) by mouth every 6 (six) hours. (Patient not taking: Reported on 11/5/2024), Disp: 40 tablet, Rfl: 0  ASSESSMENT AND PLAN    1. Deviated nasal septum    2. Keloid    3. Nasal obstruction  Chronically obstructed nasally.  She has  trialed fluticasone and antihistamines in the past.  I did ask her to trial Singulair Claritin and Astelin nasal spray for now and I did recommend she undergo a CT scan to evaluate her nasal and paranasal anatomy as we did begin discussions regarding possible need for septoplasty and turbinate reduction for her chronic nasal obstruction.  Return to see me after her scan.  - CT SINUS (CPT=70486); Future        This note was prepared using Dragon [employer] voice recognition dictation software. As a result errors may occur. When identified these errors have been corrected. While every attempt is made to correct errors during dictation discrepancies may still exist    Iron Rojas MD    11/19/2024    2:03 PM

## 2024-12-05 ENCOUNTER — TELEPHONE (OUTPATIENT)
Dept: OBGYN CLINIC | Facility: CLINIC | Age: 34
End: 2024-12-05

## 2024-12-05 ENCOUNTER — POSTPARTUM (OUTPATIENT)
Dept: OBGYN CLINIC | Facility: CLINIC | Age: 34
End: 2024-12-05
Payer: MEDICAID

## 2024-12-05 VITALS
BODY MASS INDEX: 30.99 KG/M2 | SYSTOLIC BLOOD PRESSURE: 112 MMHG | WEIGHT: 168.38 LBS | DIASTOLIC BLOOD PRESSURE: 72 MMHG | HEIGHT: 62 IN

## 2024-12-05 DIAGNOSIS — Z30.430 ENCOUNTER FOR IUD INSERTION: ICD-10-CM

## 2024-12-05 LAB
CONTROL LINE PRESENT WITH A CLEAR BACKGROUND (YES/NO): YES YES/NO
KIT LOT #: NORMAL NUMERIC
PREGNANCY TEST, URINE: NEGATIVE

## 2024-12-05 PROCEDURE — 58300 INSERT INTRAUTERINE DEVICE: CPT | Performed by: OBSTETRICS & GYNECOLOGY

## 2024-12-05 PROCEDURE — 81025 URINE PREGNANCY TEST: CPT | Performed by: OBSTETRICS & GYNECOLOGY

## 2024-12-05 NOTE — PROGRESS NOTES
[facility]  Obstetrics and Gynecology   Postpartum Progress Note    Subjective:     Kailyn Vieyra is a 34 year old  female who is s/p s/p  section on 10/16/24 for  arrest of labor, fetal intolerance.  Obstetrical  history is significant for no complications   Patient with uncomplicated postpartum course.. She reports doing well. Baby doing well and breast feeding. The patient reports vagina bleeding resolved. The patient denies emotional concerns.     Review of Systems:  General:  denies fevers, chills, fatigue and malaise.   Respiratory:  denies SOB, dyspnea, cough or wheezing  Cardiovascular:  denies chest pain, palpitations, exercise intolerance   GI: denies abdominal pain, diarrhea, constipation  :  denies dysuria, hematuria, increased urinary frequency.  denies abnormal uterine bleeding or vaginal discharge.       Objective:     Vitals:    24 1234   BP: 112/72   Weight: 168 lb 6.4 oz (76.4 kg)   Height: 62\"         Body mass index is 30.8 kg/m².    GENERAL: well developed, well nourished, in no apparent distress, alert and orientated X 3  PSYCH: mood and affect stable   SKIN: no rashes, no lesions  HEENT: normal  LUNGS: respiration unlabored  CARDIOVASCULAR: no peripheral edema or varicosities, skin warm and dry     ABDOMEN: Soft, non distended; non tender, no masses  GYNE/:   External Genitalia: normal, no lesions, good perineal support  Urethra: meatus normal   Bladder: well supported  Vagina: normal mucosa, no lesions,  discharge   Uterus: normal size, mobile, nontender  Cervix: normal os, no lesions or bleeding  Adnexa:normal size, bilaterally nontender, no palpable masses  Cul-de-sac: normal  R/V: normal perineum, no hemorrhoids  EXTREMITIES:  Normal range of motion, strength 5/5, nontender without edema    Labs:       EPDS 2    Assessment:     Kailyn Vieyra is a 34 year old  female who presents for postpartum visit   Patient Active Problem List    Diagnosis    Encounter for supervision of other normal pregnancy, unspecified trimester ([facility])    Fetal intolerance to labor, delivered, current hospitalization ([facility])     delivery delivered ([facility])    Thick meconium stained amniotic fluid    True knot of umbilical cord ([facility])    Postpartum anemia ([facility])    Postoperative anemia due to acute blood loss       ICD-10-CM    1. Encounter for postpartum visit ([facility])  Z39.2       2. Encounter for IUD insertion  Z30.430 Urine Pregnancy Test     INSERT INTRAUTERINE DEVICE [23191]     Levonorgestrel (Mirena) IUD 1 each            Plan:     Postpartum exam   - s/p  section   - doing well, no complaints   - no abnormal findings on physical exam   - may return to normal activity     Contraception counseling   - discussion held with patient about family planning and contraception  - pt desires Mirena - placed today.    All of the findings and plan were discussed with the patient.  She notes understanding and agrees with the plan of care.  All questions were answered to the best of my ability at this time.    RTC in 3-4 week IUD check     Zunilda Valdivia MD

## 2024-12-05 NOTE — PROCEDURES
IUD INSERTION PROCEDURE NOTE  [facility]    Kailyn Vieyra is a 34 year old female.    Pt is here for IUD placement. Mirena  PAP: 10/26/2022  STI: none  Pregnancy test: negative   Prior contraception: not currently active    The patient was informed regarding indication for procedure, technique, side effects and alternatives. The risks of proceudre including bleeding and infection as well as an approximately 1/1000 risk of uterine perforation with assiciated need for surgical removal of the device. Reviewed small risk of expulsion. Reviewed a <0.5% rsk of pregnancy failure with intrauterine contraception and risk for ectopic pregnancy in this case.   Informed consent was obtained. Time out performed.     EXAM:  : normal external vagina; speculum exam reveals normal vaginal walls and normal cervix    PROCEDURE:  The patient was prepped and draped. Tenaculum was placed on the cervix. The uterus sounded to 7 cm. The IUD was inserted. The IUD applicator was removed without difficulty. The IUD strings were shortened to 3-4 cm. Good hemostasis at the tenaculum site and the cervical os was noted.     Aftercare instructions were provided to the patient.   The patient indicates understanding of these issues and agrees to the plan.  The patient is asked to return in 3-4 weeks for IUD check.

## 2024-12-05 NOTE — TELEPHONE ENCOUNTER
Called pt did not receive belly band, looked at order and it was signed, provided pt with number to [facility] and faxed order again.  Pt agrees.      Sent message to DR. Valdivia in regards to sai.      Zunilda Valdivia MD  P [facility] Clinical Staff  Patient states request for belly band was denied. Please investigate and inform patient.  Thanks!

## 2024-12-09 ENCOUNTER — HOSPITAL ENCOUNTER (OUTPATIENT)
Dept: CT IMAGING | Facility: HOSPITAL | Age: 34
Discharge: HOME | End: 2024-12-09
Attending: OTOLARYNGOLOGY
Payer: MEDICAID

## 2024-12-09 DIAGNOSIS — J34.89 NASAL OBSTRUCTION: ICD-10-CM

## 2024-12-09 PROCEDURE — 70486 CT MAXILLOFACIAL W/O DYE: CPT | Performed by: OTOLARYNGOLOGY

## 2024-12-17 ENCOUNTER — OFFICE VISIT (OUTPATIENT)
Dept: OTOLARYNGOLOGY | Facility: CLINIC | Age: 34
End: 2024-12-17
Payer: MEDICAID

## 2024-12-17 DIAGNOSIS — J34.2 DEVIATED NASAL SEPTUM: Primary | ICD-10-CM

## 2024-12-17 PROCEDURE — 99214 OFFICE O/P EST MOD 30 MIN: CPT | Performed by: OTOLARYNGOLOGY

## 2024-12-17 NOTE — PROGRESS NOTES
Kailyn Vieyra is a 34 year old female.    Chief Complaint   Patient presents with    Follow - Up     Patient is here due to deviated nasal septum follow up. Reports improvement        HISTORY OF PRESENT ILLNESS  She presents with a 1 year history of a right posterior earlobe keloid.  States that she had a piercing at that site remove the piercing and developed a keloid subsequent to that.  It has been growing over the last several months.  Also complains of chronic nasal obstruction most of her life.  States that she has been a chronic mouth breather perhaps slightly worse during her current pregnancy of 2 months.      24 not delivered by  doing much better at this time with no real significant pain or discomfort.  Baby is-month-old and she is not breast-feeding or pumping to feed by bottle.  Baby is essentially just taking formula.  Since the delivery of her son she has noted some improvement in her congestive issues but not to the point where she feels comfortable breathing.  Previously noted to have a deviated septum and significant nasal mucosal congestion.  She has trialed fluticasone and allergy meds before she was pregnant and they rarely ever helped her very much.  No other signs, symptoms or complaints at this time.     24 presents with history of deviated septum and turbinate hypertrophy.  She has trialed fluticasone Astelin as well as other medications with some improvement in congestive issues but continued nasal obstruction due to her deviated septum.  Here to discuss further surgical management.      Social History     Socioeconomic History    Marital status: Single   Tobacco Use    Smoking status: Never    Smokeless tobacco: Never   Vaping Use    Vaping status: Never Used   Substance and Sexual Activity    Alcohol use: Not Currently    Drug use: No    Sexual activity: Yes     Partners: Male     Birth control/protection: I.U.D.   Other Topics Concern    Reaction to local  anesthetic No    Pt has a pacemaker No    Pt has a defibrillator No       Family History   Problem Relation Age of Onset    Diabetes Father         69    Hypertension Father     Renal Disease Father     Hypertension Mother     Arthritis Mother     Crohn's Disease Son     Other (bladder cancer) Maternal Grandmother     Cancer Maternal Grandmother     No Known Problems Brother     No Known Problems Brother     Renal Disease Half-Brother     No Known Problems Half-Sister        Past Medical History:    Allergic rhinitis    Anemia    Heart murmur    born with heart murmur unsure if was transfused blood    Rosacea       Past Surgical History:   Procedure Laterality Date    Anesth,open heart surgery      8yo for heart murmur for PFO?          Colposcopy,bx cervix/endocerv curr  10/2018      2013    Remove intrauterine device           REVIEW OF SYSTEMS    System Neg/Pos Details   Constitutional Negative Fatigue, fever and weight loss.   ENMT Negative Drooling.   Eyes Negative Blurred vision and vision changes.   Respiratory Negative Dyspnea and wheezing.   Cardio Negative Chest pain, irregular heartbeat/palpitations and syncope.   GI Negative Abdominal pain and diarrhea.   Endocrine Negative Cold intolerance and heat intolerance.   Neuro Negative Tremors.   Psych Negative Anxiety and depression.   Integumentary Negative Frequent skin infections, pigment change and rash.   Hema/Lymph Negative Easy bleeding and easy bruising.           PHYSICAL EXAM    LMP 01/15/2024 (Exact Date)        Constitutional Normal Overall appearance - Normal.   Psychiatric Normal Orientation - Oriented to time, place, person & situation. Appropriate mood and affect.   Neck Exam Normal Inspection - Normal. Palpation - Normal. Parotid gland - Normal. Thyroid gland - Normal.   Eyes Normal Conjunctiva - Right: Normal, Left: Normal. Pupil - Right: Normal, Left: Normal. Fundus - Right: Normal, Left: Normal.   Neurological Normal  Memory - Normal. Cranial nerves - Cranial nerves II through XII grossly intact.   Head/Face Normal Facial features - Normal. Eyebrows - Normal. Skull - Normal.        Nasopharynx Normal External nose - Normal. Lips/teeth/gums - Normal. Tonsils - Normal. Oropharynx - Normal.   Ears Normal Inspection - Right: Normal, Left: Normal. Canal - Right: Normal, Left: Normal. TM - Right: Normal, Left: Normal.   Skin Normal Inspection - Normal.        Lymph Detail Normal Submental. Submandibular. Anterior cervical. Posterior cervical. Supraclavicular.        Nose/Mouth/Throat Normal External nose - Normal. Lips/teeth/gums - Normal. Tonsils - Normal. Oropharynx - Normal.   Nose/Mouth/Throat Normal Nares - Right: Normal Left: Normal. Septum -deviated to the right 75% turbinates - Right: Mild hypertrophy left: Moderate hypertrophy       Current Outpatient Medications:     montelukast 10 MG Oral Tab, Take 1 tablet (10 mg total) by mouth nightly., Disp: 30 tablet, Rfl: 3    azelastine 0.1 % Nasal Solution, 2 sprays by Nasal route 2 (two) times daily., Disp: 30 mL, Rfl: 3    ferrous sulfate 325 (65 FE) MG Oral Tab EC, Take 1 tablet (325 mg total) by mouth in the morning and 1 tablet (325 mg total) before bedtime., Disp: 240 tablet, Rfl: 0    loratadine 10 MG Oral Tab, Take 1 tablet (10 mg total) by mouth daily. (Patient not taking: Reported on 12/5/2024), Disp: 30 tablet, Rfl: 3    acetaminophen 500 MG Oral Tab, Take 2 tablets (1,000 mg total) by mouth every 6 (six) hours. (Patient not taking: Reported on 12/5/2024), Disp: 40 tablet, Rfl: 0    docusate sodium 100 MG Oral Cap, Take 100 mg by mouth 2 (two) times daily as needed for constipation. (Patient not taking: Reported on 11/5/2024), Disp: 20 capsule, Rfl: 0    gabapentin 300 MG Oral Cap, Take 1 capsule (300 mg total) by mouth every 8 (eight) hours as needed (For breakthrough moderate pain). (Patient not taking: Reported on 11/5/2024), Disp: 30 capsule, Rfl: 0    ibuprofen 600 MG  Oral Tab, Take 1 tablet (600 mg total) by mouth every 6 (six) hours. (Patient not taking: Reported on 11/5/2024), Disp: 40 tablet, Rfl: 0    prenatal vitamin with DHA 27-0.8-228 MG Oral Cap, Take 1 capsule by mouth daily. (Patient not taking: Reported on 12/5/2024), Disp: , Rfl:   ASSESSMENT AND PLAN    1. Deviated nasal septum  Deviated septum on exam with turban hypertrophy bilaterally.  I did recommend septoplasty and turbinate reduction.  As she continues to have nasal obstruction despite allergy meds and sprays.  Risks and benefits of surgery were discussed including the risks of postoperative pain, bleeding as well as anesthesia use and more specifically for persistent nasal obstruction despite surgery.  She states understanding of these risks accepts these risks and wishes to proceed.  Greater than 30 minutes spent with the patient in discussions regarding surgical management of her chronic nasal obstructive issues.        This note was prepared using [employer] voice recognition dictation software. As a result errors may occur. When identified these errors have been corrected. While every attempt is made to correct errors during dictation discrepancies may still exist    Iron Rojas MD    12/17/2024    3:06 PM

## 2025-01-08 ENCOUNTER — OFFICE VISIT (OUTPATIENT)
Dept: OBGYN CLINIC | Facility: CLINIC | Age: 35
End: 2025-01-08
Payer: MEDICAID

## 2025-01-08 VITALS
BODY MASS INDEX: 30.25 KG/M2 | WEIGHT: 164.38 LBS | SYSTOLIC BLOOD PRESSURE: 116 MMHG | HEIGHT: 62 IN | DIASTOLIC BLOOD PRESSURE: 68 MMHG

## 2025-01-08 DIAGNOSIS — Z30.431 SURVEILLANCE FOR BIRTH CONTROL, INTRAUTERINE DEVICE: Primary | ICD-10-CM

## 2025-01-08 PROCEDURE — 99213 OFFICE O/P EST LOW 20 MIN: CPT | Performed by: OBSTETRICS & GYNECOLOGY

## 2025-01-08 NOTE — PROGRESS NOTES
IUD CHECK  [facility]    Return Office Visit  CHIEF COMPLAINT:    Chief Complaint   Patient presents with    IUD     Iud check        SUBJECTIVE:   34 year [id number] here for   Chief Complaint   Patient presents with    IUD     Iud check       Mirena placed 24   Denies pelvic pain, dyspareunia, vaginal discharge and odor.    Has had intermittent spotting/light bleeding.  Can feel strings.  Denies     Last Pap Date:  10/26/2022 Result:  NILM, negative HPV    REVIEW OF SYSTEMS:   CONSTITUTIONAL:  negative for fevers, chills, sweats and fatigue  GASTROINTESTINAL:  negative for nausea, vomiting, blood in stool, constipation, diarrhea and abdominal pain  GENITOURINARY: negative for dysuria and frequency  Negative for pelvic pain   SKIN:  negative for  rash, skin lesion and pruritus  ENDOCRINE:  negative for acne, fatigue, weight gain and weight loss  BEHAVIOR/PSYCH:  negative for depressed mood, anhedonia and anxiety      O: BP: 122/76 (18 1545)  Length: 160 cm (5' 3\") (18 1545)  Weight: 77.1 kg (170 lb) (18 1545)    PHYSICAL EXAM:   No LMP recorded. /76   Ht 160 cm (5' 3\")   Wt 77.1 kg (170 lb)   BMI 30.11 kg/m²     CONSTITUTIONAL:  Awake, alert, cooperative, no apparent distress  EYES: sclera clear and conjunctiva normal  GASTROINTESTINAL:  normal bowel sounds, soft, non-distended, non-tender, no masses palpated  GENITAL/URINARY:    External Genitalia:  General appearance; normal, Hair distribution; normal, Lesions absent   Urethral Meatus:  Lesions absent, Prolapse absent  Bladder:  Tenderness absent, Cystocele absent  Vagina:  Discharge absent, Lesions absent, Pelvic support normal  Cervix:  Lesions absent, Discharge absent, Tenderness absent; IUD strings visualized - 3.5 cm in length  Uterus:  Size normal, Masses absent, Tenderness absent  Adnexa:  Masses absent, Tenderness absent  Anus/Perineum:  Lesions absent  SKIN:  No rashes  PSYCH:  Affect Normal         ASSESSMENT AND  PLAN:    ICD-10-CM    1. Surveillance for birth control, intrauterine device  Z30.431         Comment: strings visualized suggestive of adequate positioning  Plan:     RTC for annual exam    Zunilda Valdivia MD

## 2025-01-22 ENCOUNTER — TELEPHONE (OUTPATIENT)
Dept: OTOLARYNGOLOGY | Facility: CLINIC | Age: 35
End: 2025-01-22

## 2025-01-22 DIAGNOSIS — J34.2 DEVIATED NASAL SEPTUM: Primary | ICD-10-CM

## 2025-01-22 DIAGNOSIS — L91.0 KELOID: ICD-10-CM

## 2025-01-22 DIAGNOSIS — J34.3 HYPERTROPHY OF NASAL TURBINATES: ICD-10-CM

## 2025-01-22 NOTE — TELEPHONE ENCOUNTER
Patient is scheduled for surgery on 2/19/25 with Dr. Rojas at [facility].

## 2025-02-20 NOTE — DISCHARGE INSTRUCTIONS
HOME INSTRUCTIONS    No exercise, heavy lifting or bending over until follow up   No blowing your nose, wipe as needed  Change dressing under nose as needed  Diet as tolerated  Complete entire course of antibiotics  Alternate Tylenol and Ibuprofen for mild/moderate pain, Norco as needed for severe pain  Follow up in 8 days    [facility]G HOME CARE INSTRUCTIONS: POST-OP ANESTHESIA  The medication that you received for sedation or general anesthesia can last up to 24 hours. Your judgment and reflexes may be altered, even if you feel like your normal self.      We Recommend:   Do not drive any motor vehicle or bicycle   Avoid mowing the lawn, playing sports, or working with power tools/applicances (power saws, electric knives or mixers)   That you have someone stay with you on your first night home   Do not drink alcohol or take sleeping pills or tranquilizers   Do not sign legal documents within 24 hours of your procedure   If you had a nerve block for your surgery, take extra care not to put any pressure on your arm or hand for 24 hours    It is normal:  For you to have a sore throat if you had a breathing tube during surgery (while you were asleep!). The sore throat should get better within 48 hours. You can gargle with warm salt water (1/2 tsp in 4 oz warm water) or use a throat lozenge for comfort  To feel muscle aches or soreness especially in the abdomen, chest or neck. The achy feeling should go away in the next 24 hours  To feel weak, sleepy or \"wiped out\". Your should start feeling better in the next 24 hours.   To experience mild discomforts such as sore lip or tongue, headache, cramps, gas pains or a bloated feeling in your abdomen.   To experience mild back pain or soreness for a day or two if you had spinal or epidural anesthesia.   If you had laparoscopic surgery, to feel shoulder pain or discomfort on the day of surgery.   For some patients to have nausea after surgery/anesthesia    If you feel nausea or  experience vomiting:   Try to move around less.   Eat less than usual or drink only liquids until the next morning   Nausea should resolve in about 24 hours    If you have a problem when you are at home:    Call your surgeons office   Discharge Instructions: After Your Surgery  You’ve just had surgery. During surgery, you were given medicine called anesthesia to keep you relaxed and free of pain. After surgery, you may have some pain or nausea. This is common. Here are some tips for feeling better and getting well after surgery.   Going home  Your healthcare provider will show you how to take care of yourself when you go home. They'll also answer your questions. Have an adult family member or friend drive you home. For the first 24 hours after your surgery:   Don't drive or use heavy equipment.  Don't make important decisions or sign legal papers.  Take medicines as directed.  Don't drink alcohol.  Have someone stay with you, if needed. They can watch for problems and help keep you safe.  Be sure to go to all follow-up visits with your healthcare provider. And rest after your surgery for as long as your provider tells you to.   Coping with pain  If you have pain after surgery, pain medicine will help you feel better. Take it as directed, before pain becomes severe. Also, ask your healthcare provider or pharmacist about other ways to control pain. This might be with heat, ice, or relaxation. And follow any other instructions your surgeon or nurse gives you.      Stay on schedule with your medicine.     Tips for taking pain medicine  To get the best relief possible, remember these points:   Pain medicines can upset your stomach. Taking them with a little food may help.  Most pain relievers taken by mouth need at least 20 to 30 minutes to start to work.  Don't wait till your pain becomes severe before you take your medicine. Try to time your medicine so that you can take it before starting an activity. This might be  before you get dressed, go for a walk, or sit down for dinner.  Constipation is a common side effect of some pain medicines. Call your healthcare provider before taking any medicines such as laxatives or stool softeners to help ease constipation. Also ask if you should skip any foods. Drinking lots of fluids and eating foods such as fruits and vegetables that are high in fiber can also help. Remember, don't take laxatives unless your surgeon has prescribed them.  Drinking alcohol and taking pain medicine can cause dizziness and slow your breathing. It can even be deadly. Don't drink alcohol while taking pain medicine.  Pain medicine can make you react more slowly to things. Don't drive or run machinery while taking pain medicine.  Your healthcare provider may tell you to take acetaminophen to help ease your pain. Ask them how much you're supposed to take each day. Acetaminophen or other pain relievers may interact with your prescription medicines or other over-the-counter (OTC) medicines. Some prescription medicines have acetaminophen and other ingredients in them. Using both prescription and OTC acetaminophen for pain can cause you to accidentally overdose. Read the labels on your OTC medicines with care. This will help you to clearly know the list of ingredients, how much to take, and any warnings. It may also help you not take too much acetaminophen. If you have questions or don't understand the information, ask your pharmacist or healthcare provider to explain it to you before you take the OTC medicine.   Managing nausea  Some people have an upset stomach (nausea) after surgery. This is often because of anesthesia, pain, or pain medicine, less movement of food in the stomach, or the stress of surgery. These tips will help you handle nausea and eat healthy foods as you get better. If you were on a special food plan before surgery, ask your healthcare provider if you should follow it while you get better. Check  with your provider on how your eating should progress. It may depend on the surgery you had. These general tips may help:   Don't push yourself to eat. Your body will tell you when to eat and how much.  Start off with clear liquids and soup. They're easier to digest.  Next try semi-solid foods as you feel ready. These include mashed potatoes, applesauce, and gelatin.  Slowly move to solid foods. Don’t eat fatty, rich, or spicy foods at first.  Don't force yourself to have 3 large meals a day. Instead eat smaller amounts more often.  Take pain medicines with a small amount of solid food, such as crackers or toast. This helps prevent nausea.  When to call your healthcare provider  Call your healthcare provider right away if you have any of these:   You still have too much pain, or the pain gets worse, after taking the medicine. The medicine may not be strong enough. Or there may be a complication from the surgery.  You feel too sleepy, dizzy, or groggy. The medicine may be too strong.  Side effects such as nausea or vomiting. Your healthcare provider may advise taking other medicines to .  Skin changes such as rash, itching, or hives. This may mean you have an allergic reaction. Your provider may advise taking other medicines.  The incision looks different (for instance, part of it opens up).  Bleeding or fluid leaking from the incision site, and weren't told to expect that.  Fever of 100.4°F (38°C) or higher, or as directed by your provider.  Call 911  Call 911 right away if you have:   Trouble breathing  Facial swelling    If you have obstructive sleep apnea   You were given anesthesia medicine during surgery to keep you comfortable and free of pain. After surgery, you may have more apnea spells because of this medicine and other medicines you were given. The spells may last longer than normal.    At home:  Keep using the continuous positive airway pressure (CPAP) device when you sleep. Unless your healthcare  provider tells you not to, use it when you sleep, day or night. CPAP is a common device used to treat obstructive sleep apnea.  Talk with your provider before taking any pain medicine, muscle relaxants, or sedatives. Your provider will tell you about the possible dangers of taking these medicines.  Contact your provider if your sleeping changes a lot even when taking medicines as directed.  [employer] last reviewed this educational content on 10/1/2021  © 2026-5563 [employer]. All rights reserved. This information is not intended as a substitute for professional medical care. Always follow your healthcare professional's instructions.

## 2025-02-24 ENCOUNTER — ANESTHESIA EVENT (OUTPATIENT)
Dept: SURGERY | Facility: HOSPITAL | Age: 35
End: 2025-02-24
Payer: MEDICAID

## 2025-02-24 ENCOUNTER — HOSPITAL ENCOUNTER (OUTPATIENT)
Facility: HOSPITAL | Age: 35
Setting detail: HOSPITAL OUTPATIENT SURGERY
Discharge: HOME | End: 2025-02-24
Attending: OTOLARYNGOLOGY | Admitting: OTOLARYNGOLOGY
Payer: MEDICAID

## 2025-02-24 ENCOUNTER — ANESTHESIA (OUTPATIENT)
Dept: SURGERY | Facility: HOSPITAL | Age: 35
End: 2025-02-24
Payer: MEDICAID

## 2025-02-24 VITALS
BODY MASS INDEX: 30.18 KG/M2 | RESPIRATION RATE: 14 BRPM | OXYGEN SATURATION: 96 % | HEIGHT: 62 IN | WEIGHT: 164 LBS | TEMPERATURE: 98 F | HEART RATE: 64 BPM | DIASTOLIC BLOOD PRESSURE: 61 MMHG | SYSTOLIC BLOOD PRESSURE: 112 MMHG

## 2025-02-24 DIAGNOSIS — J34.2 DNS (DEVIATED NASAL SEPTUM): Primary | ICD-10-CM

## 2025-02-24 DIAGNOSIS — J34.3 HYPERTROPHY OF NASAL TURBINATES: ICD-10-CM

## 2025-02-24 DIAGNOSIS — J34.2 DEVIATED NASAL SEPTUM: ICD-10-CM

## 2025-02-24 DIAGNOSIS — L91.0 KELOID: ICD-10-CM

## 2025-02-24 LAB — B-HCG UR QL: NEGATIVE

## 2025-02-24 PROCEDURE — 0HB2XZZ EXCISION OF RIGHT EAR SKIN, EXTERNAL APPROACH: ICD-10-PCS | Performed by: OTOLARYNGOLOGY

## 2025-02-24 PROCEDURE — 12052 INTMD RPR FACE/MM 2.6-5.0 CM: CPT | Performed by: OTOLARYNGOLOGY

## 2025-02-24 PROCEDURE — 30802 ABLATE INF TURBINATE SUBMUC: CPT | Performed by: OTOLARYNGOLOGY

## 2025-02-24 PROCEDURE — 30520 REPAIR OF NASAL SEPTUM: CPT | Performed by: OTOLARYNGOLOGY

## 2025-02-24 PROCEDURE — 11442 EXC FACE-MM B9+MARG 1.1-2 CM: CPT | Performed by: OTOLARYNGOLOGY

## 2025-02-24 PROCEDURE — 09SM0ZZ REPOSITION NASAL SEPTUM, OPEN APPROACH: ICD-10-PCS | Performed by: OTOLARYNGOLOGY

## 2025-02-24 PROCEDURE — 095L7ZZ DESTRUCTION OF NASAL TURBINATE, VIA NATURAL OR ARTIFICIAL OPENING: ICD-10-PCS | Performed by: OTOLARYNGOLOGY

## 2025-02-24 RX ORDER — HYDROMORPHONE HYDROCHLORIDE 1 MG/ML
0.2 INJECTION, SOLUTION INTRAMUSCULAR; INTRAVENOUS; SUBCUTANEOUS EVERY 5 MIN PRN
Status: DISCONTINUED | OUTPATIENT
Start: 2025-02-24 | End: 2025-02-24

## 2025-02-24 RX ORDER — HYDROMORPHONE HYDROCHLORIDE 1 MG/ML
0.4 INJECTION, SOLUTION INTRAMUSCULAR; INTRAVENOUS; SUBCUTANEOUS EVERY 2 HOUR PRN
OUTPATIENT
Start: 2025-02-24

## 2025-02-24 RX ORDER — SCOPOLAMINE 1 MG/3D
1 PATCH, EXTENDED RELEASE TRANSDERMAL
Status: DISCONTINUED | OUTPATIENT
Start: 2025-02-24 | End: 2025-02-24 | Stop reason: HOSPADM

## 2025-02-24 RX ORDER — LIDOCAINE HYDROCHLORIDE AND EPINEPHRINE 10; 10 MG/ML; UG/ML
INJECTION, SOLUTION INFILTRATION; PERINEURAL AS NEEDED
Status: DISCONTINUED | OUTPATIENT
Start: 2025-02-24 | End: 2025-02-24 | Stop reason: HOSPADM

## 2025-02-24 RX ORDER — OXYCODONE HYDROCHLORIDE 5 MG/1
10 TABLET ORAL EVERY 4 HOURS PRN
OUTPATIENT
Start: 2025-02-24

## 2025-02-24 RX ORDER — LIDOCAINE HYDROCHLORIDE 10 MG/ML
INJECTION, SOLUTION EPIDURAL; INFILTRATION; INTRACAUDAL; PERINEURAL AS NEEDED
Status: DISCONTINUED | OUTPATIENT
Start: 2025-02-24 | End: 2025-02-24 | Stop reason: SURG

## 2025-02-24 RX ORDER — MORPHINE SULFATE 10 MG/ML
6 INJECTION, SOLUTION INTRAMUSCULAR; INTRAVENOUS EVERY 10 MIN PRN
Status: DISCONTINUED | OUTPATIENT
Start: 2025-02-24 | End: 2025-02-24

## 2025-02-24 RX ORDER — FAMOTIDINE 10 MG/ML
20 INJECTION, SOLUTION INTRAVENOUS ONCE
Status: COMPLETED | OUTPATIENT
Start: 2025-02-24 | End: 2025-02-24

## 2025-02-24 RX ORDER — TRIAMCINOLONE ACETONIDE 40 MG/ML
INJECTION, SUSPENSION INTRA-ARTICULAR; INTRAMUSCULAR AS NEEDED
Status: DISCONTINUED | OUTPATIENT
Start: 2025-02-24 | End: 2025-02-24 | Stop reason: HOSPADM

## 2025-02-24 RX ORDER — DEXAMETHASONE SODIUM PHOSPHATE 4 MG/ML
VIAL (ML) INJECTION AS NEEDED
Status: DISCONTINUED | OUTPATIENT
Start: 2025-02-24 | End: 2025-02-24 | Stop reason: SURG

## 2025-02-24 RX ORDER — SODIUM CHLORIDE/ALOE VERA
GEL (GRAM) NASAL AS NEEDED
Status: DISCONTINUED | OUTPATIENT
Start: 2025-02-24 | End: 2025-02-24 | Stop reason: HOSPADM

## 2025-02-24 RX ORDER — NALOXONE HYDROCHLORIDE 0.4 MG/ML
0.08 INJECTION, SOLUTION INTRAMUSCULAR; INTRAVENOUS; SUBCUTANEOUS AS NEEDED
Status: DISCONTINUED | OUTPATIENT
Start: 2025-02-24 | End: 2025-02-24

## 2025-02-24 RX ORDER — ONDANSETRON 4 MG/1
4 TABLET, ORALLY DISINTEGRATING ORAL EVERY 6 HOURS PRN
OUTPATIENT
Start: 2025-02-24

## 2025-02-24 RX ORDER — MORPHINE SULFATE 4 MG/ML
4 INJECTION, SOLUTION INTRAMUSCULAR; INTRAVENOUS EVERY 10 MIN PRN
Status: DISCONTINUED | OUTPATIENT
Start: 2025-02-24 | End: 2025-02-24

## 2025-02-24 RX ORDER — HYDROCODONE BITARTRATE AND ACETAMINOPHEN 7.5; 325 MG/1; MG/1
1 TABLET ORAL EVERY 6 HOURS PRN
Qty: 20 TABLET | Refills: 0 | Status: SHIPPED | OUTPATIENT
Start: 2025-02-24

## 2025-02-24 RX ORDER — ONDANSETRON 2 MG/ML
4 INJECTION INTRAMUSCULAR; INTRAVENOUS EVERY 6 HOURS PRN
OUTPATIENT
Start: 2025-02-24

## 2025-02-24 RX ORDER — EPHEDRINE SULFATE 50 MG/ML
INJECTION, SOLUTION INTRAVENOUS AS NEEDED
Status: DISCONTINUED | OUTPATIENT
Start: 2025-02-24 | End: 2025-02-24 | Stop reason: SURG

## 2025-02-24 RX ORDER — ONDANSETRON 2 MG/ML
INJECTION INTRAMUSCULAR; INTRAVENOUS AS NEEDED
Status: DISCONTINUED | OUTPATIENT
Start: 2025-02-24 | End: 2025-02-24 | Stop reason: SURG

## 2025-02-24 RX ORDER — HYDROMORPHONE HYDROCHLORIDE 1 MG/ML
0.6 INJECTION, SOLUTION INTRAMUSCULAR; INTRAVENOUS; SUBCUTANEOUS EVERY 5 MIN PRN
Status: DISCONTINUED | OUTPATIENT
Start: 2025-02-24 | End: 2025-02-24

## 2025-02-24 RX ORDER — HYDROMORPHONE HYDROCHLORIDE 1 MG/ML
0.8 INJECTION, SOLUTION INTRAMUSCULAR; INTRAVENOUS; SUBCUTANEOUS EVERY 2 HOUR PRN
OUTPATIENT
Start: 2025-02-24

## 2025-02-24 RX ORDER — SODIUM CHLORIDE, SODIUM LACTATE, POTASSIUM CHLORIDE, CALCIUM CHLORIDE 600; 310; 30; 20 MG/100ML; MG/100ML; MG/100ML; MG/100ML
INJECTION, SOLUTION INTRAVENOUS CONTINUOUS
Status: DISCONTINUED | OUTPATIENT
Start: 2025-02-24 | End: 2025-02-24

## 2025-02-24 RX ORDER — ALBUTEROL SULFATE 0.83 MG/ML
2.5 SOLUTION RESPIRATORY (INHALATION) AS NEEDED
Status: DISCONTINUED | OUTPATIENT
Start: 2025-02-24 | End: 2025-02-24

## 2025-02-24 RX ORDER — OXYCODONE HYDROCHLORIDE 5 MG/1
5 TABLET ORAL EVERY 4 HOURS PRN
OUTPATIENT
Start: 2025-02-24

## 2025-02-24 RX ORDER — MORPHINE SULFATE 4 MG/ML
2 INJECTION, SOLUTION INTRAMUSCULAR; INTRAVENOUS EVERY 10 MIN PRN
Status: DISCONTINUED | OUTPATIENT
Start: 2025-02-24 | End: 2025-02-24

## 2025-02-24 RX ORDER — ROCURONIUM BROMIDE 10 MG/ML
INJECTION, SOLUTION INTRAVENOUS AS NEEDED
Status: DISCONTINUED | OUTPATIENT
Start: 2025-02-24 | End: 2025-02-24 | Stop reason: SURG

## 2025-02-24 RX ORDER — HYDROMORPHONE HYDROCHLORIDE 1 MG/ML
0.4 INJECTION, SOLUTION INTRAMUSCULAR; INTRAVENOUS; SUBCUTANEOUS EVERY 5 MIN PRN
Status: DISCONTINUED | OUTPATIENT
Start: 2025-02-24 | End: 2025-02-24

## 2025-02-24 RX ORDER — SODIUM CHLORIDE 0.9 % (FLUSH) 0.9 %
10 SYRINGE (ML) INJECTION AS NEEDED
OUTPATIENT
Start: 2025-02-24

## 2025-02-24 RX ORDER — METOCLOPRAMIDE 10 MG/1
10 TABLET ORAL ONCE
Status: COMPLETED | OUTPATIENT
Start: 2025-02-24 | End: 2025-02-24

## 2025-02-24 RX ORDER — PROCHLORPERAZINE EDISYLATE 5 MG/ML
5 INJECTION INTRAMUSCULAR; INTRAVENOUS EVERY 8 HOURS PRN
Status: DISCONTINUED | OUTPATIENT
Start: 2025-02-24 | End: 2025-02-24

## 2025-02-24 RX ORDER — ACETAMINOPHEN 500 MG
1000 TABLET ORAL ONCE
Status: COMPLETED | OUTPATIENT
Start: 2025-02-24 | End: 2025-02-24

## 2025-02-24 RX ORDER — ONDANSETRON 2 MG/ML
4 INJECTION INTRAMUSCULAR; INTRAVENOUS EVERY 6 HOURS PRN
Status: DISCONTINUED | OUTPATIENT
Start: 2025-02-24 | End: 2025-02-24

## 2025-02-24 RX ORDER — METOCLOPRAMIDE HYDROCHLORIDE 5 MG/ML
10 INJECTION INTRAMUSCULAR; INTRAVENOUS ONCE
Status: COMPLETED | OUTPATIENT
Start: 2025-02-24 | End: 2025-02-24

## 2025-02-24 RX ORDER — ACETAMINOPHEN 325 MG/1
650 TABLET ORAL
OUTPATIENT
Start: 2025-02-24

## 2025-02-24 RX ORDER — FAMOTIDINE 20 MG/1
20 TABLET, FILM COATED ORAL ONCE
Status: COMPLETED | OUTPATIENT
Start: 2025-02-24 | End: 2025-02-24

## 2025-02-24 RX ORDER — CEPHALEXIN 500 MG/1
500 CAPSULE ORAL EVERY 8 HOURS
Qty: 21 CAPSULE | Refills: 0 | Status: SHIPPED | OUTPATIENT
Start: 2025-02-24

## 2025-02-24 RX ADMIN — EPHEDRINE SULFATE 7.5 MG: 50 INJECTION, SOLUTION INTRAVENOUS at 09:33:00

## 2025-02-24 RX ADMIN — LIDOCAINE HYDROCHLORIDE 40 MG: 10 INJECTION, SOLUTION EPIDURAL; INFILTRATION; INTRACAUDAL; PERINEURAL at 09:18:00

## 2025-02-24 RX ADMIN — ONDANSETRON 4 MG: 2 INJECTION INTRAMUSCULAR; INTRAVENOUS at 09:49:00

## 2025-02-24 RX ADMIN — SODIUM CHLORIDE, SODIUM LACTATE, POTASSIUM CHLORIDE, CALCIUM CHLORIDE: 600; 310; 30; 20 INJECTION, SOLUTION INTRAVENOUS at 10:09:00

## 2025-02-24 RX ADMIN — DEXAMETHASONE SODIUM PHOSPHATE 8 MG: 4 MG/ML VIAL (ML) INJECTION at 09:26:00

## 2025-02-24 RX ADMIN — ROCURONIUM BROMIDE 50 MG: 10 INJECTION, SOLUTION INTRAVENOUS at 09:19:00

## 2025-02-24 NOTE — ANESTHESIA POSTPROCEDURE EVALUATION
Patient: Kailyn Vieyra    Procedure Summary       Date: 02/24/25 Room / Location: [facility] MAIN OR 02 / [facility] MAIN OR    Anesthesia Start: 0915 Anesthesia Stop: 1009    Procedures:       Nasal septoplasty bilateral turbinectomy and excision of right ear keloid with Kenalog injection (Nose)      EXCISION LESION HEAD/NECK/FACE (Ear) Diagnosis:       Deviated nasal septum      Hypertrophy of nasal turbinates      Keloid      (Deviated nasal septum [J34.2]Hypertrophy of nasal turbinates [J34.3]Keloid [L91.0])    Surgeons: Iron Rojas MD Anesthesiologist: Iron Morin MD    Anesthesia Type: general ASA Status: 2            Anesthesia Type: general    Vitals Value Taken Time   /61 02/24/25 1054   Temp 97.9 °F (36.6 °C) 02/24/25 1048   Pulse 64 02/24/25 1054   Resp 14 02/24/25 1054   SpO2 96 % 02/24/25 1054       [facility] AN Post Evaluation:   Patient Evaluated in PACU  Patient Participation: complete - patient participated  Level of Consciousness: awake and alert  Pain Score: 0  Pain Management: adequate  Airway Patency:patent  Dental exam unchanged from preop  Yes    Nausea/Vomiting: none  Cardiovascular Status: acceptable  Respiratory Status: acceptable  Postoperative Hydration acceptable      Iron Morin MD  2/24/2025 12:16 PM

## 2025-02-24 NOTE — H&P
HISTORY OF PRESENT ILLNESS  She presents with a 1 year history of a right posterior earlobe keloid.  States that she had a piercing at that site remove the piercing and developed a keloid subsequent to that.  It has been growing over the last several months.  Also complains of chronic nasal obstruction most of her life.  States that she has been a chronic mouth breather perhaps slightly worse during her current pregnancy of 2 months.      24 not delivered by  doing much better at this time with no real significant pain or discomfort.  Baby is-month-old and she is not breast-feeding or pumping to feed by bottle.  Baby is essentially just taking formula.  Since the delivery of her son she has noted some improvement in her congestive issues but not to the point where she feels comfortable breathing.  Previously noted to have a deviated septum and significant nasal mucosal congestion.  She has trialed fluticasone and allergy meds before she was pregnant and they rarely ever helped her very much.  No other signs, symptoms or complaints at this time.     24 presents with history of deviated septum and turbinate hypertrophy.  She has trialed fluticasone Astelin as well as other medications with some improvement in congestive issues but continued nasal obstruction due to her deviated septum.  Here to discuss further surgical management.      25 Here for septoplasty and turbinate reduction. Risks and benefits discussed. She accepts these risks and wishes to proceed.   Social Hx on file   Social History            Socioeconomic History    Marital status: Single   Tobacco Use    Smoking status: Never    Smokeless tobacco: Never   Vaping Use    Vaping status: Never Used   Substance and Sexual Activity    Alcohol use: Not Currently    Drug use: No    Sexual activity: Yes       Partners: Male       Birth control/protection: I.U.D.   Other Topics Concern    Reaction to local anesthetic No    Pt has a  pacemaker No    Pt has a defibrillator No            Family History         Family History   Problem Relation Age of Onset    Diabetes Father           69    Hypertension Father      Renal Disease Father      Hypertension Mother      Arthritis Mother      Crohn's Disease Son      Other (bladder cancer) Maternal Grandmother      Cancer Maternal Grandmother      No Known Problems Brother      No Known Problems Brother      Renal Disease Half-Brother      No Known Problems Half-Sister              Past Medical History       Past Medical History:    Allergic rhinitis    Anemia    Heart murmur     born with heart murmur unsure if was transfused blood    Rosacea            Past Surgical History         Past Surgical History:   Procedure Laterality Date    Anesth,open heart surgery         8yo for heart murmur for PFO?            Colposcopy,bx cervix/endocerv curr   10/2018       2013    Remove intrauterine device                   REVIEW OF SYSTEMS     System Neg/Pos Details   Constitutional Negative Fatigue, fever and weight loss.   ENMT Negative Drooling.   Eyes Negative Blurred vision and vision changes.   Respiratory Negative Dyspnea and wheezing.   Cardio Negative Chest pain, irregular heartbeat/palpitations and syncope.   GI Negative Abdominal pain and diarrhea.   Endocrine Negative Cold intolerance and heat intolerance.   Neuro Negative Tremors.   Psych Negative Anxiety and depression.   Integumentary Negative Frequent skin infections, pigment change and rash.   Hema/Lymph Negative Easy bleeding and easy bruising.               PHYSICAL EXAM     LMP 01/15/2024 (Exact Date)           Constitutional Normal Overall appearance - Normal.   Psychiatric Normal Orientation - Oriented to time, place, person & situation. Appropriate mood and affect.   Neck Exam Normal Inspection - Normal. Palpation - Normal. Parotid gland - Normal. Thyroid gland - Normal.   Eyes Normal Conjunctiva - Right: Normal, Left:  Normal. Pupil - Right: Normal, Left: Normal. Fundus - Right: Normal, Left: Normal.   Neurological Normal Memory - Normal. Cranial nerves - Cranial nerves II through XII grossly intact.   Head/Face Normal Facial features - Normal. Eyebrows - Normal. Skull - Normal.           Nasopharynx Normal External nose - Normal. Lips/teeth/gums - Normal. Tonsils - Normal. Oropharynx - Normal.   Ears Normal Inspection - Right: Normal, Left: Normal. Canal - Right: Normal, Left: Normal. TM - Right: Normal, Left: Normal.   Skin Normal Inspection - Normal.           Lymph Detail Normal Submental. Submandibular. Anterior cervical. Posterior cervical. Supraclavicular.           Nose/Mouth/Throat Normal External nose - Normal. Lips/teeth/gums - Normal. Tonsils - Normal. Oropharynx - Normal.   Nose/Mouth/Throat Normal Nares - Right: Normal Left: Normal. Septum -deviated to the right 75% turbinates - Right: Mild hypertrophy left: Moderate hypertrophy        Medications - Current      Current Outpatient Medications:     montelukast 10 MG Oral Tab, Take 1 tablet (10 mg total) by mouth nightly., Disp: 30 tablet, Rfl: 3    azelastine 0.1 % Nasal Solution, 2 sprays by Nasal route 2 (two) times daily., Disp: 30 mL, Rfl: 3    ferrous sulfate 325 (65 FE) MG Oral Tab EC, Take 1 tablet (325 mg total) by mouth in the morning and 1 tablet (325 mg total) before bedtime., Disp: 240 tablet, Rfl: 0    loratadine 10 MG Oral Tab, Take 1 tablet (10 mg total) by mouth daily. (Patient not taking: Reported on 12/5/2024), Disp: 30 tablet, Rfl: 3    acetaminophen 500 MG Oral Tab, Take 2 tablets (1,000 mg total) by mouth every 6 (six) hours. (Patient not taking: Reported on 12/5/2024), Disp: 40 tablet, Rfl: 0    docusate sodium 100 MG Oral Cap, Take 100 mg by mouth 2 (two) times daily as needed for constipation. (Patient not taking: Reported on 11/5/2024), Disp: 20 capsule, Rfl: 0    gabapentin 300 MG Oral Cap, Take 1 capsule (300 mg total) by mouth every 8  (eight) hours as needed (For breakthrough moderate pain). (Patient not taking: Reported on 11/5/2024), Disp: 30 capsule, Rfl: 0    ibuprofen 600 MG Oral Tab, Take 1 tablet (600 mg total) by mouth every 6 (six) hours. (Patient not taking: Reported on 11/5/2024), Disp: 40 tablet, Rfl: 0    prenatal vitamin with DHA 27-0.8-228 MG Oral Cap, Take 1 capsule by mouth daily. (Patient not taking: Reported on 12/5/2024), Disp: , Rfl:      ASSESSMENT AND PLAN     1. Deviated nasal septum  Deviated septum on exam with turban hypertrophy bilaterally.  I did recommend septoplasty and turbinate reduction.  As she continues to have nasal obstruction despite allergy meds and sprays.  Risks and benefits of surgery were discussed including the risks of postoperative pain, bleeding as well as anesthesia use and more specifically for persistent nasal obstruction despite surgery.  She states understanding of these risks accepts these risks and wishes to proceed.  Greater than 30 minutes spent with the patient in discussions regarding surgical management of her chronic nasal obstructive issues.           This note was prepared using Dragon Medical voice recognition dictation software. As a result errors may occur. When identified these errors have been corrected. While every attempt is made to correct errors during dictation discrepancies may still exist     Iron Rojas MD

## 2025-02-24 NOTE — OPERATIVE REPORT
Preoperative diagnosis: #1 septal deviation #2 inferior turbinate hypertrophy #3 right earlobe keloid 1.75 cm    Postop diagnosis: Same    Procedures performed #1 septoplasty #2 submucosal reduction of inferior turbinates bilaterally #3 outfracture of inferior turbinates bilaterally #4 excision of 1.75 cm keloid #5 intermediate closure of 2.5 cm earlobe defect #6 Kenalog 40 injection 0.5 mL    Surgeon: Iron Rojas MD    Date of service: 2/24/2025    Anesthesia: General Endotracheal anesthesia    EBL: Less than 10 mL    Indications: Patient presents with 2 concerns chronic nasal obstruction refractory to medical management requiring septoplasty and turbinate reduction as well as an enlarging keloid of the right posterior earlobe requiring excision and closure    Procedure: Patient was taken to the operating room placed in supine position following the induction of general endotracheal esthesia the nose was addressed first.  The nasal mucosa was swabbed with 4% cocaine followed by infiltration of the same mucosa with 1% Xylocaine with 1-100,000 of epinephrine.  The patient was then prepped and draped in regular fashion.  The right earlobe was examined demonstrating a 1.75 cm lesion on the posterior surface of the earlobe and this was infiltrated with 1% Xylocaine with 1-100,000 of epinephrine.  The nose was addressed first with a hemitransfixion incision being performed on the right followed by careful elevation of the mucoperichondrium posteriorly to the warmer and perpendicular plate of the ethmoid bones posteriorly.  A full transfixion of the cartilage was made leaving a 1.5 cm caudal and dorsal cartilaginous strut.  The mucoperichondrium was then elevated on the left side posteriorly allowing for the removal of all bent and abnormal portions of bone and cartilage including a small spur along the maxillary crest inferiorly on the right.  The mucoperichondrial flaps were then placed in midline position and sutured  into this position using a 4-0 plain gut transseptal mattress stitch.  The hemitransfixion incision anteriorly on the right was then closed using a series of 5-0 fast-absorbing gut sutures.  The inferior turbinates were then addressed using a 45 reflex Coblation wand at a power setting of 6 for ablate and 3 for coagulate.  3 passes were performed through the left inferior turbinate submucosally and 2 passes were performed through the right inferior turbinate submucosally.  The submucosal inferior turbinate tissues were addressed using a 45 reflex Coblation wand at 1 cm intervals for 10-second periods.  Despite adequate reduction of submucosal tissues bilaterally the bony portions of each inferior turbinate were noted to impinge upon the nasal airway therefore an outfracturing technique was performed bilaterally.  1 Merocel splint was then placed in each nare attention was then turned to the right ear.  The lesion was removed in a vertical fashion creating a defect measuring roughly 2.5 cm which is essentially full-thickness and some underlying subcutaneous fat on the posterior surface of the earlobe.  Any bleeding sites were controlled using bipolar cautery.  An intermediate closure measuring 2.5 cm was then performed using deep subcutaneous 5-0 fast-absorbing gut stitches followed by eversion and closure of the skin layer using a running locked 5-0 fast of Orient suture.  Kenalog-40 was injected with a total of 0.25 mL of Kenalog injected on each side of the incision subcutaneously.  A tape and benzoin dressing was then placed and the patient was subsidy awakened extubated and taken to recovery room without a complications for procedure.  EBL was less than 10 mL.

## 2025-02-24 NOTE — ANESTHESIA PREPROCEDURE EVALUATION
Anesthesia PreOp Note    HPI:     Kailyn Vieyra is a 34 year old female who presents for preoperative consultation requested by: Iron Rojas MD    Date of Surgery: 2025    Procedure(s):  Nasal septoplasty bilateral turbinectomy and excision of right ear keloid with Kenalog injection  EXCISION LESION HEAD/NECK/FACE  Indication: Deviated nasal septum [J34.2]  Hypertrophy of nasal turbinates [J34.3]  Keloid [L91.0]    Relevant Problems   No relevant active problems       NPO:                         History Review:  Patient Active Problem List    Diagnosis Date Noted    Postpartum anemia ([facility]) 10/20/2024    Postoperative anemia due to acute blood loss 10/20/2024    Fetal intolerance to labor, delivered, current hospitalization ([facility]) 10/17/2024     delivery delivered ([facility]) 10/17/2024    Thick meconium stained amniotic fluid 10/17/2024    True knot of umbilical cord ([facility]) 10/17/2024    Encounter for supervision of other normal pregnancy, unspecified trimester ([facility]) 2024       Past Medical History:    Allergic rhinitis    Anemia    Blood disorder    Heart murmur    born with heart murmur unsure if was transfused blood    Hx of motion sickness    Rosacea       Past Surgical History:   Procedure Laterality Date    Anesth,open heart surgery      6yo for heart murmur for PFO?          Colposcopy,bx cervix/endocerv curr  10/2018      2013    Remove intrauterine device         Prescriptions Prior to Admission[1]  Current Medications and Prescriptions Ordered in Epic[2]    Allergies[3]    Family History   Problem Relation Age of Onset    Diabetes Father         69    Hypertension Father     Renal Disease Father     Hypertension Mother     Arthritis Mother     Crohn's Disease Son     Other (bladder cancer) Maternal Grandmother     Cancer Maternal Grandmother     No Known Problems Brother     No Known Problems Brother     Renal Disease Half-Brother     No Known Problems Half-Sister       Social History     Socioeconomic History    Marital status: Single   Tobacco Use    Smoking status: Never    Smokeless tobacco: Never   Vaping Use    Vaping status: Never Used   Substance and Sexual Activity    Alcohol use: Not Currently    Drug use: No    Sexual activity: Yes     Partners: Male     Birth control/protection: I.U.D.   Other Topics Concern    Reaction to local anesthetic No    Pt has a pacemaker No    Pt has a defibrillator No       Available pre-op labs reviewed.             Vital Signs:  Body mass index is 30.18 kg/m².   height is 1.575 m (5' 2\") and weight is 74.8 kg (165 lb).   Vitals:    02/18/25 1608   Weight: 74.8 kg (165 lb)   Height: 1.575 m (5' 2\")        Anesthesia Evaluation     Patient summary reviewed and Nursing notes reviewed    History of anesthetic complications (H/O motion sickness)   Airway   Mallampati: II  TM distance: >3 FB  Neck ROM: full  Dental - Dentition appears grossly intact     Pulmonary     breath sounds clear to auscultation  Cardiovascular   Exercise tolerance: good    Rhythm: regular  Rate: normal    Neuro/Psych      GI/Hepatic/Renal - negative ROS     Endo/Other - negative ROS     Comments: Deviated septum/nasal obstruction/hypertrophic turbinates  Abdominal                  Anesthesia Plan:   ASA:  2  Plan:   General  Airway:  ETT and Video laryngoscope  Informed Consent Plan and Risks Discussed With:  Patient  Discussed plan with:  Attending      I have informed Kailyn Vieyra and/or legal guardian or family member of the nature of the anesthetic plan, benefits, risks including possible dental damage if relevant, major complications, and any alternative forms of anesthetic management.   All of the patient's questions were answered to the best of my ability. The patient desires the anesthetic management as planned.  Iron Morin MD  2/24/2025 7:47 AM  Present on Admission:  **None**           [1]   No medications prior to admission.   [2]   No current  Epic-ordered facility-administered medications on file.     Current Outpatient Medications Ordered in Epic   Medication Sig Dispense Refill    montelukast 10 MG Oral Tab Take 1 tablet (10 mg total) by mouth nightly. 30 tablet 3    azelastine 0.1 % Nasal Solution 2 sprays by Nasal route 2 (two) times daily. 30 mL 3    ferrous sulfate 325 (65 FE) MG Oral Tab EC Take 1 tablet (325 mg total) by mouth in the morning and 1 tablet (325 mg total) before bedtime. 240 tablet 0   [3]   Allergies  Allergen Reactions    Cat Hair Extract OTHER (SEE COMMENTS)     Watery eyes, stuffy nose    Dander OTHER (SEE COMMENTS)     Runny nose, stuffy nose, red/ watery eyes    Pollen OTHER (SEE COMMENTS)     Itchy eyes and runny nose

## 2025-02-24 NOTE — INTERVAL H&P NOTE
Pre-op Diagnosis: Deviated nasal septum [J34.2]  Hypertrophy of nasal turbinates [J34.3]  Keloid [L91.0]    The above referenced H&P was reviewed by Iron Rojas MD on 2/24/2025, the patient was examined and no significant changes have occurred in the patient's condition since the H&P was performed.  I discussed with the patient and/or legal representative the potential benefits, risks and side effects of this procedure; the likelihood of the patient achieving goals; and potential problems that might occur during recuperation.  I discussed reasonable alternatives to the procedure, including risks, benefits and side effects related to the alternatives and risks related to not receiving this procedure.  We will proceed with procedure as planned.      
Pre-op Diagnosis: Deviated nasal septum [J34.2]  Hypertrophy of nasal turbinates [J34.3]  Keloid [L91.0]  Patient also has a 1.5 cm keloid of the right posterior ear lobe that will be removed with Kenalog injection,  The above referenced H&P was reviewed by Iron Rojas MD on 2/24/2025, the patient was examined and no significant changes have occurred in the patient's condition since the H&P was performed.  I discussed with the patient and/or legal representative the potential benefits, risks and side effects of this procedure; the likelihood of the patient achieving goals; and potential problems that might occur during recuperation.  I discussed reasonable alternatives to the procedure, including risks, benefits and side effects related to the alternatives and risks related to not receiving this procedure.  We will proceed with procedure as planned.      
Detail Level: Zone
Include Location In Plan?: No

## 2025-02-24 NOTE — ANESTHESIA PROCEDURE NOTES
Airway  Date/Time: 2/24/2025 9:21 AM  Urgency: elective    Airway not difficult    General Information and Staff    Patient location during procedure: OR  Anesthesiologist: Iron Morin MD  Performed: anesthesiologist   Performed by: Iron Morin MD  Authorized by: Iron Morin MD      Indications and Patient Condition  Indications for airway management: anesthesia  Sedation level: deep  Preoxygenated: yes  Patient position: sniffing  MILS maintained throughout  Mask difficulty assessment: 1 - vent by mask    Final Airway Details  Final airway type: endotracheal airway      Successful airway: ETT  Cuffed: yes   Successful intubation technique: Video laryngoscopy  Facilitating devices/methods: intubating stylet  Endotracheal tube insertion site: oral  Blade type: [employer] video laryngoscope.  Blade size: #3  ETT size (mm): 7.5    Cormack-Lehane Classification: grade I - full view of glottis  Placement verified by: capnometry   Measured from: lips  ETT to lips (cm): 21  Number of attempts at approach: 1

## 2025-02-25 ENCOUNTER — TELEPHONE (OUTPATIENT)
Dept: OTOLARYNGOLOGY | Facility: CLINIC | Age: 35
End: 2025-02-25

## 2025-02-25 NOTE — TELEPHONE ENCOUNTER
Left message to call back -pt is post day 1 nasal septoplasty/smr and excision of right ear keloid with kenalog

## 2025-02-25 NOTE — TELEPHONE ENCOUNTER
Pt is post op day 1  septoplasty, SMR and excision of keloid.  Per  Pt pt is doing well no bleeding, advised pt no bending or heavy lifting for the next week and pt is not to blow nose until seen by . Advised pt she can start using OTC saline nasal spray daily, afrin up to 5 days post op and pt to apply Vaseline on the outer edge of the nose up to four times a day. Reviewed post op medications. Advised pt to contact our office if any increased bleeding  (saturating more than 4 mustache dressings within the hour) or fever greater than 102. Pt verbalized understanding.

## 2025-03-04 ENCOUNTER — OFFICE VISIT (OUTPATIENT)
Dept: OTOLARYNGOLOGY | Facility: CLINIC | Age: 35
End: 2025-03-04

## 2025-03-04 DIAGNOSIS — J34.2 DEVIATED NASAL SEPTUM: Primary | ICD-10-CM

## 2025-03-04 PROCEDURE — 99024 POSTOP FOLLOW-UP VISIT: CPT | Performed by: OTOLARYNGOLOGY

## 2025-03-05 NOTE — PROGRESS NOTES
Kailyn Vieyra is a 34 year old female.    Chief Complaint   Patient presents with    Post-Op     Septoplasty and keloid excision        HISTORY OF PRESENT ILLNESS  She presents with a 1 year history of a right posterior earlobe keloid.  States that she had a piercing at that site remove the piercing and developed a keloid subsequent to that.  It has been growing over the last several months.  Also complains of chronic nasal obstruction most of her life.  States that she has been a chronic mouth breather perhaps slightly worse during her current pregnancy of 2 months.      24 not delivered by  doing much better at this time with no real significant pain or discomfort.  Baby is-month-old and she is not breast-feeding or pumping to feed by bottle.  Baby is essentially just taking formula.  Since the delivery of her son she has noted some improvement in her congestive issues but not to the point where she feels comfortable breathing.  Previously noted to have a deviated septum and significant nasal mucosal congestion.  She has trialed fluticasone and allergy meds before she was pregnant and they rarely ever helped her very much.  No other signs, symptoms or complaints at this time.     24 presents with history of deviated septum and turbinate hypertrophy.  She has trialed fluticasone Astelin as well as other medications with some improvement in congestive issues but continued nasal obstruction due to her deviated septum.  Here to discuss further surgical management.      25 Here for septoplasty and turbinate reduction. Risks and benefits discussed. She accepts these risks and wishes to proceed.     3/4/25 she is 8 days out from septoplasty and turbinate reduction.  Already notes improvement in her breathing.  Here for routine nasal cleaning.  No new signs, symptoms or complaints.      Social History     Socioeconomic History    Marital status: Single   Tobacco Use    Smoking status: Never     Smokeless tobacco: Never   Vaping Use    Vaping status: Never Used   Substance and Sexual Activity    Alcohol use: Not Currently    Drug use: No    Sexual activity: Yes     Partners: Male     Birth control/protection: I.U.D.   Other Topics Concern    Reaction to local anesthetic No    Pt has a pacemaker No    Pt has a defibrillator No       Family History   Problem Relation Age of Onset    Diabetes Father         69    Hypertension Father     Renal Disease Father     Hypertension Mother     Arthritis Mother     Crohn's Disease Son     Other (bladder cancer) Maternal Grandmother     Cancer Maternal Grandmother     No Known Problems Brother     No Known Problems Brother     Renal Disease Half-Brother     No Known Problems Half-Sister        Past Medical History:    Allergic rhinitis    Anemia    Blood disorder    Heart murmur    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          Colposcopy,bx cervix/endocerv curr  10/2018      2013    Remove intrauterine device           REVIEW OF SYSTEMS    System Neg/Pos Details   Constitutional Negative Fatigue, fever and weight loss.   ENMT Negative Drooling.   Eyes Negative Blurred vision and vision changes.   Respiratory Negative Dyspnea and wheezing.   Cardio Negative Chest pain, irregular heartbeat/palpitations and syncope.   GI Negative Abdominal pain and diarrhea.   Endocrine Negative Cold intolerance and heat intolerance.   Neuro Negative Tremors.   Psych Negative Anxiety and depression.   Integumentary Negative Frequent skin infections, pigment change and rash.   Hema/Lymph Negative Easy bleeding and easy bruising.           PHYSICAL EXAM    LMP 2024 (Exact Date)        Constitutional Normal Overall appearance - Normal.   Psychiatric Normal Orientation - Oriented to time, place,  person & situation. Appropriate mood and affect.   Neck Exam Normal Inspection - Normal. Palpation - Normal. Parotid gland - Normal. Thyroid gland - Normal.   Eyes Normal Conjunctiva - Right: Normal, Left: Normal. Pupil - Right: Normal, Left: Normal. Fundus - Right: Normal, Left: Normal.   Neurological Normal Memory - Normal. Cranial nerves - Cranial nerves II through XII grossly intact.   Head/Face Normal Facial features - Normal. Eyebrows - Normal. Skull - Normal.        Nasopharynx Normal External nose - Normal. Lips/teeth/gums - Normal. Tonsils - Normal. Oropharynx - Normal.   Ears Normal Inspection - Right: Normal, Left: Normal. Canal - Right: Normal, Left: Normal. TM - Right: Normal, Left: Normal.   Skin Normal Inspection - Normal.        Lymph Detail Normal Submental. Submandibular. Anterior cervical. Posterior cervical. Supraclavicular.        Nose/Mouth/Throat Normal External nose - Normal. Lips/teeth/gums - Normal. Tonsils - Normal. Oropharynx - Normal.   Nose/Mouth/Throat Normal Nares - Right: Normal Left: Normal. Septum -Normal  Turbinates - Right: Normal, Left: Normal.       Current Outpatient Medications:     HYDROcodone-acetaminophen 7.5-325 MG Oral Tab, Take 1 tablet by mouth every 6 (six) hours as needed for Pain., Disp: 20 tablet, Rfl: 0    cephALEXin 500 MG Oral Cap, Take 1 capsule (500 mg total) by mouth every 8 (eight) hours., Disp: 21 capsule, Rfl: 0    montelukast 10 MG Oral Tab, Take 1 tablet (10 mg total) by mouth nightly., Disp: 30 tablet, Rfl: 3    ferrous sulfate 325 (65 FE) MG Oral Tab EC, Take 1 tablet (325 mg total) by mouth in the morning and 1 tablet (325 mg total) before bedtime., Disp: 240 tablet, Rfl: 0  ASSESSMENT AND PLAN    1. Deviated nasal septum  Doing very well.  Nasal cavities clear of mucus debris.  Continue saline Vaseline she may now blow her nose.  Return to see me as needed and she may continue with allergy meds orally for now and add sprays in a few  weeks.        This note was prepared using Dragon Medical voice recognition dictation software. As a result errors may occur. When identified these errors have been corrected. While every attempt is made to correct errors during dictation discrepancies may still exist    Iron Rojas MD    3/4/2025    10:42 PM

## 2025-03-09 ENCOUNTER — TELEPHONE (OUTPATIENT)
Dept: FAMILY MEDICINE CLINIC | Facility: CLINIC | Age: 35
End: 2025-03-09

## 2025-03-09 ENCOUNTER — TELEMEDICINE (OUTPATIENT)
Dept: FAMILY MEDICINE CLINIC | Facility: CLINIC | Age: 35
End: 2025-03-09
Payer: MEDICAID

## 2025-03-09 DIAGNOSIS — L29.9 LOCALIZED PRURITUS: ICD-10-CM

## 2025-03-09 DIAGNOSIS — L50.9 URTICARIA: Primary | ICD-10-CM

## 2025-03-09 RX ORDER — PREDNISONE 20 MG/1
TABLET ORAL
Qty: 10 TABLET | Refills: 0 | Status: SHIPPED | OUTPATIENT
Start: 2025-03-09

## 2025-03-09 NOTE — PROGRESS NOTES
VIDEO VISIT PROGRESS NOTE (Non-Covid-19)  Todays date: 3/9/2025 11:50 AM        Most recent Nurse Triage message / Mychart message from patient:           The following individual(s) verbally consented to be recorded using ambient AI listening technology and understand that they can each withdraw their consent to this listening technology at any point by asking the clinician to turn off or pause the recording:    Patient name: Kailyn Vieyra  Additional names:            Due to the COVID-19 emergency implementation plan, this patient's visit was converted to a video visit as agreed upon with the patient.    Epic on-call video Check-In    Kailyn Vieyra verbally consents to a Video Check-In service on 03/09/25.  Patient understands and accepts financial responsibility for any deductible, co-insurance and/or co-pays associated with this service.  Patient call triage note reviewed.      I spoke to Kailyn Vieyra (or patient's family member/partner) by video, verified date of birth, and discussed current concerns.      If patient is a child then of course the parent or guardian will be giving the verbal consent for the video check-in and of course I will be speaking to this person for this visit.  Note, many times the patient however is nearby and I can hear the patient answering questions while the person on the video with me is talking to the patient.    This also goes for family members who would rather speak to me on behalf of their [place]-speaking (or another foreign language) patient.  The patient will give consent but I will be speaking to the person that would be translating.  Also some of these patients with possible COVID-19 infection or some type of other illness are too ill to be on video and would rather have a designated person speak for them and in that case we will be speaking to that designated person and all parties involved understand the disclaimer that goes along with the consent for the video  check-in service as stated above.        History of Present Illness:     History of Present Illness  The patient presents with a rash and itching.    She has a rash that began yesterday morning, described as red, blotchy, and resembling urticaria. It primarily affects the left arm and forearm, with some involvement of the neck and a few spots on the right arm. The rash is not present on her abdomen.    The rash is associated with itching, which she manages with Benadryl. Benadryl provides relief from itching for about four hours, after which she needs to take another dose. No shortness of breath or swelling of the tongue or lips.    She recently tried a new toothpaste but denies starting any new medications. She is completing a course of Cefalexin, which she has been taking for six days following surgery on her nose and to remove a keloid from her right ear. Today is the last day of her antibiotic course, with two doses remaining.          Duration of video along with documentation in minutes: 5 minutes on video with an additional 5 minutes on chart review and on documentation        Wt Readings from Last 3 Encounters:   25 164 lb (74.4 kg)   25 164 lb 6.4 oz (74.6 kg)   24 168 lb 6.4 oz (76.4 kg)       BMI Readings from Last 3 Encounters:   25 30.00 kg/m²   25 30.07 kg/m²   24 30.80 kg/m²       BP Readings from Last 3 Encounters:   25 112/61   25 116/68   24 112/72         Review of Systems  No chest pain or trouble breathing.  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          Colposcopy,bx cervix/endocerv  curr  10/2018      2013    Remove intrauterine device            Current Outpatient Medications   Medication Sig Dispense Refill    HYDROcodone-acetaminophen 7.5-325 MG Oral Tab Take 1 tablet by mouth every 6 (six) hours as needed for Pain. 20 tablet 0    cephALEXin 500 MG Oral Cap Take 1 capsule (500 mg total) by mouth every 8 (eight) hours. 21 capsule 0    montelukast 10 MG Oral Tab Take 1 tablet (10 mg total) by mouth nightly. 30 tablet 3    ferrous sulfate 325 (65 FE) MG Oral Tab EC Take 1 tablet (325 mg total) by mouth in the morning and 1 tablet (325 mg total) before bedtime. 240 tablet 0     Allergies:Allergies[1]         Physical Exam:   Limited examination due to this being a video visit       Patient was speaking in complete sentences, no increased work of breathing and very coherent and alert on the phone.  Alert and oriented x 3. Patient appears well-developed and well-nourished. No distress.  Patient was responding to questions appropriately.  Patient did not sound short of breath.  Patient has a normal mood and affect.  She had no lip or tongue swelling.  The eyes and face were not swollen.  Physical Exam    NEUROLOGICAL: Cranial nerves grossly intact. Moves all extremities.  She was standing and speaking clearly.  SKIN: Red, blotchy rash resembling urticaria on left arm and forearm mostly. Few oval irregular lesions on neck. Few of the same characteristic lesions on the right forearm x 2.  No lesions on the abdomen, face, back, legs.        Assessment / Plan:      Diagnoses and all orders for this visit:    Urticaria  -     predniSONE 20 MG Oral Tab; Take 2 by mouth at same time daily for 5 days. (Best taken at BREAKFAST or LUNCH)  Today is her last day of cephalexin and I told her just to hold off the last 2 doses.  She can continue the Benadryl as it does help for about 4 hours.  Prednisone as directed.  Gave her instructions on when to go to the emergency room.  Avoid hot showers or  excessive sun exposure as it will make you itch worse.  Localized pruritus  -     predniSONE 20 MG Oral Tab; Take 2 by mouth at same time daily for 5 days. (Best taken at BREAKFAST or LUNCH)          AMBAR to Dr. Marisa Murdock (PCP      Assessment & Plan  Urticaria  Acute urticaria presenting as a red, blotchy rash with pruritus, primarily on the left arm and forearm, with some involvement of the neck and right arm. No associated dyspnea or angioedema. Likely related to recent Cefalexin use post-keloid removal surgery. Benadryl provides temporary relief. Discontinuation of Cefalexin as today marks the end of the course. Prednisone prescribed to manage urticaria with expectation of spontaneous resolution. Informed consent obtained regarding prednisone's benefits and potential side effects. Advised to monitor for dyspnea or angioedema requiring emergency care.  - Discontinue Cefalexin  - Prescribe prednisone, 2 pills daily for 5 days, preferably at lunch  - Continue Benadryl for pruritus as needed  - Advise to avoid hot showers and excessive sun exposure to reduce pruritus  - Instruct to seek emergency care if dyspnea or angioedema occurs  - Follow up with primary care if symptoms persist        Medical History:         Reviewed Allergies:  Allergies[2]   Reviewed Past Medical History:  Past Medical History:    Allergic rhinitis    Anemia    Blood disorder    Heart murmur    born with heart murmur unsure if was transfused blood    Hx of motion sickness    Rosacea      Reviewed Family History:  Family History   Problem Relation Age of Onset    Diabetes Father         69    Hypertension Father     Renal Disease Father     Hypertension Mother     Arthritis Mother     Crohn's Disease Son     Other (bladder cancer) Maternal Grandmother     Cancer Maternal Grandmother     No Known Problems Brother     No Known Problems Brother     Renal Disease Half-Brother     No Known Problems Half-Sister        Reviewed Social  History:  Social History     Socioeconomic History    Marital status: Single   Tobacco Use    Smoking status: Never    Smokeless tobacco: Never   Vaping Use    Vaping status: Never Used   Substance and Sexual Activity    Alcohol use: Not Currently    Drug use: No    Sexual activity: Yes     Partners: Male     Birth control/protection: I.U.D.   Other Topics Concern    Reaction to local anesthetic No    Pt has a pacemaker No    Pt has a defibrillator No     Social Drivers of Health     Food Insecurity: No Food Insecurity (10/2/2024)    Food Insecurity     Food Insecurity: Never true   Transportation Needs: No Transportation Needs (10/2/2024)    Transportation Needs     Lack of Transportation: No   Stress: Stress Concern Present (10/2/2024)    Stress     Feeling of Stress : Yes   Housing Stability: Low Risk  (10/2/2024)    Housing Stability     Housing Instability: No      Reviewed Current Medications:  Current Outpatient Medications   Medication Sig Dispense Refill    HYDROcodone-acetaminophen 7.5-325 MG Oral Tab Take 1 tablet by mouth every 6 (six) hours as needed for Pain. 20 tablet 0    cephALEXin 500 MG Oral Cap Take 1 capsule (500 mg total) by mouth every 8 (eight) hours. 21 capsule 0    montelukast 10 MG Oral Tab Take 1 tablet (10 mg total) by mouth nightly. 30 tablet 3    ferrous sulfate 325 (65 FE) MG Oral Tab EC Take 1 tablet (325 mg total) by mouth in the morning and 1 tablet (325 mg total) before bedtime. 240 tablet 0            Kailyn Vieyra advised to follow CDC guidelines for self isolation and symptomatic treatment as outlined on CDC Patient Guidelines. Kailyn Vieyra understands video evaluation is not a substitute for face-to-face examination or emergency care. Patient advised to go to ER or call 911 for worsening symptoms or acute distress. (NOTE: Not every complaint above will be related to the COVID-19 pandemic).    Please note that the following visit was completed using two-way, real-time  interactive audio and/or video communication.  This has been done in good ghulam to provide continuity of care in the best interest of the provider-patient relationship, due to the ongoing public health crisis/national emergency and because of restrictions of visitation.  There are limitations of this visit as no physical exam could be performed.  Every conscious effort was taken to allow for sufficient and adequate time.  This billing was spent on reviewing labs, medications, radiology tests and decision making.  Appropriate medical decision-making and tests are ordered as detailed in the plan of care above.    Virgilio Bueno DO  3/9/2025            [1]   Allergies  Allergen Reactions    Cat Hair Extract OTHER (SEE COMMENTS)     Watery eyes, stuffy nose    Dander OTHER (SEE COMMENTS)     Runny nose, stuffy nose, red/ watery eyes    Pollen OTHER (SEE COMMENTS)     Itchy eyes and runny nose    [2]   Allergies  Allergen Reactions    Cat Hair Extract OTHER (SEE COMMENTS)     Watery eyes, stuffy nose    Dander OTHER (SEE COMMENTS)     Runny nose, stuffy nose, red/ watery eyes    Pollen OTHER (SEE COMMENTS)     Itchy eyes and runny nose

## 2025-03-09 NOTE — TELEPHONE ENCOUNTER
Ajay Cunningham,    I did a video visit for your patient while I was on call today.  MARIA LUISA Amaya

## 2025-03-21 ENCOUNTER — OFFICE VISIT (OUTPATIENT)
Dept: FAMILY MEDICINE CLINIC | Facility: CLINIC | Age: 35
End: 2025-03-21

## 2025-03-21 VITALS
OXYGEN SATURATION: 97 % | DIASTOLIC BLOOD PRESSURE: 66 MMHG | HEART RATE: 73 BPM | SYSTOLIC BLOOD PRESSURE: 96 MMHG | HEIGHT: 62 IN | BODY MASS INDEX: 29.81 KG/M2 | WEIGHT: 162 LBS

## 2025-03-21 DIAGNOSIS — J30.2 SEASONAL ALLERGIES: ICD-10-CM

## 2025-03-21 DIAGNOSIS — N39.3 STRESS INCONTINENCE: ICD-10-CM

## 2025-03-21 DIAGNOSIS — Z00.00 WELL ADULT EXAM: Primary | ICD-10-CM

## 2025-03-21 PROCEDURE — 99395 PREV VISIT EST AGE 18-39: CPT | Performed by: STUDENT IN AN ORGANIZED HEALTH CARE EDUCATION/TRAINING PROGRAM

## 2025-03-21 RX ORDER — CETIRIZINE HYDROCHLORIDE 10 MG/1
10 TABLET ORAL DAILY
Qty: 90 TABLET | Refills: 3 | Status: SHIPPED | OUTPATIENT
Start: 2025-03-21

## 2025-03-21 NOTE — PROGRESS NOTES
HPI:    Patient ID: Kailyn Vieyra is a 34 year old female.    HPI  Pt presenting for routine physical exam. Denies any acute issues or recent illnesses. No significant chronic medical problems. Past medical/surgical history, family history, and social history were reviewed.     S/p Csection 10/2024  Urinary leakage with sneezing    H/o seasonal allergies  Claritin with relief    Mirena caused facial rash to improve    Mood stable, denies SH/SI/HI    Review of Systems   A comprehensive 10 point review of systems was completed.  Pertinent positives and negatives noted in the the HPI.       Current Outpatient Medications   Medication Sig Dispense Refill    cetirizine 10 MG Oral Tab Take 1 tablet (10 mg total) by mouth daily. 90 tablet 3    montelukast 10 MG Oral Tab Take 1 tablet (10 mg total) by mouth nightly. 30 tablet 3    ferrous sulfate 325 (65 FE) MG Oral Tab EC Take 1 tablet (325 mg total) by mouth in the morning and 1 tablet (325 mg total) before bedtime. 240 tablet 0     Allergies:Allergies[1]   Vitals:    03/21/25 1333   BP: 96/66   Pulse: 73   SpO2: 97%   Weight: 162 lb (73.5 kg)   Height: 5' 2\" (1.575 m)       Body mass index is 29.63 kg/m².   PHYSICAL EXAM:   Physical Exam  Vitals reviewed.   Constitutional:       General: She is not in acute distress.     Appearance: Normal appearance. She is well-developed.   HENT:      Head: Normocephalic and atraumatic.      Right Ear: Tympanic membrane, ear canal and external ear normal.      Left Ear: Tympanic membrane, ear canal and external ear normal.   Eyes:      Conjunctiva/sclera: Conjunctivae normal.   Neck:      Thyroid: No thyroid mass or thyroid tenderness.   Cardiovascular:      Rate and Rhythm: Normal rate and regular rhythm.      Pulses: Normal pulses.      Heart sounds: Normal heart sounds, S1 normal and S2 normal. No murmur heard.  Pulmonary:      Effort: Pulmonary effort is normal. No respiratory distress.      Breath sounds: Normal breath sounds. No  wheezing, rhonchi or rales.   Abdominal:      General: Bowel sounds are normal.      Palpations: Abdomen is soft.      Tenderness: There is no abdominal tenderness. There is no guarding or rebound.   Musculoskeletal:      Cervical back: Normal range of motion and neck supple. No muscular tenderness.      Right lower leg: No edema.      Left lower leg: No edema.   Lymphadenopathy:      Cervical: No cervical adenopathy.   Skin:     General: Skin is warm and dry.      Coloration: Skin is not jaundiced.   Neurological:      General: No focal deficit present.      Mental Status: She is alert and oriented to person, place, and time. Mental status is at baseline.   Psychiatric:         Attention and Perception: Attention normal.         Mood and Affect: Mood normal.         Behavior: Behavior normal. Behavior is cooperative.         Cognition and Memory: Cognition normal.              ASSESSMENT/PLAN:   1. Well adult exam  Discussed preventative screenings  - Pap 10/2022  - will check labs as below  - encouraged to continue diet/exercise for overall wellness  - follow-up with eye doctor annually  - follow-up with dentist every 6 months  - return yearly for physicals  - annual flu shot  - tetanus booster every 10yrs  - TSH W Reflex To Free T4; Future  - Comp Metabolic Panel (14); Future  - Hemoglobin A1C; Future  - Lipid Panel; Future  - Vitamin D; Future  - CBC With Differential With Platelet; Future    2. Stress incontinence  Discussed role of PFPT  Provided with home exercises  - Pelvic Floor Therapy - [facility]    3. Seasonal allergies  Will trial Zyrtec dosing  Avoid triggers as able  Consider Allergy eval  - cetirizine 10 MG Oral Tab; Take 1 tablet (10 mg total) by mouth daily.  Dispense: 90 tablet; Refill: 3    Pt verbalized understanding and agrees with plan.    Orders Placed This Encounter   Procedures    TSH W Reflex To Free T4    Comp Metabolic Panel (14)    Hemoglobin A1C    Lipid Panel    Vitamin D     CBC With Differential With Platelet       Meds This Visit:  Requested Prescriptions     Signed Prescriptions Disp Refills    cetirizine 10 MG Oral Tab 90 tablet 3     Sig: Take 1 tablet (10 mg total) by mouth daily.       Imaging & Referrals:  [facility]         ID#[id number]         [1]   Allergies  Allergen Reactions    Cat Hair Extract OTHER (SEE COMMENTS)     Watery eyes, stuffy nose    Dander OTHER (SEE COMMENTS)     Runny nose, stuffy nose, red/ watery eyes    Pollen OTHER (SEE COMMENTS)     Itchy eyes and runny nose

## 2025-06-02 ENCOUNTER — OFFICE VISIT (OUTPATIENT)
Dept: PHYSICAL THERAPY | Age: 35
End: 2025-06-02
Attending: STUDENT IN AN ORGANIZED HEALTH CARE EDUCATION/TRAINING PROGRAM
Payer: MEDICAID

## 2025-06-02 DIAGNOSIS — N39.3 STRESS INCONTINENCE: Primary | ICD-10-CM

## 2025-06-02 PROCEDURE — 97530 THERAPEUTIC ACTIVITIES: CPT

## 2025-06-02 PROCEDURE — 97162 PT EVAL MOD COMPLEX 30 MIN: CPT
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
## 2025-06-11 ENCOUNTER — OFFICE VISIT (OUTPATIENT)
Dept: PHYSICAL THERAPY | Age: 35
End: 2025-06-11
Attending: STUDENT IN AN ORGANIZED HEALTH CARE EDUCATION/TRAINING PROGRAM
Payer: MEDICAID

## 2025-06-11 PROCEDURE — 97112 NEUROMUSCULAR REEDUCATION: CPT

## 2025-06-11 PROCEDURE — 97530 THERAPEUTIC ACTIVITIES: CPT

## 2025-06-11 NOTE — PROGRESS NOTES
Patient: Kailyn Vieyra (34 year old, female) Referring Provider:  Insurance:   Diagnosis: Stress incontinence (N39.3), LBP, Dyspareunia Marisa Murdock  MEDICAID   Date of Surgery: n/a Next MD visit:  N/A   Precautions:    No data recorded Referral Information:    Date of Evaluation: Req: 0, Auth: 0, Exp:     06/02/25 POC Auth Visits:  10        Today's Date   6/11/2025    Subjective  Pt reports that she has noticed she urinates about every 3 hours.  She has been making use of the urinary urge control techniques and she has not had any leakage due to urge.       Pain: 6/10 (LBP)     Objective       Informed consent for internal pelvic evaluation given: yes     External Observation: All WNL  Voluntary contraction:     Voluntary relaxation:     Involuntary contraction:     Involuntary relaxation:     Mons pubis:     Labia majora:    Labia minora:     Urethral meatus:     Introitus:     Perineal body:      Internal Examination:  Pelvic Floor Muscle strength: (PERF= Power/Endurance/Reps/Fast) MMT:  Power Endurance REPS Fast    3 5  2 3      Accessory Muscle Use:  Adductors     Tissue Laxity Test:  Anterior Wall: mild with cough  Posterior Wall: mild with cough  Apical:   WNL       Internal Palpation:  WNL except  Superficial Transverse Perineal   Mild tenderness bilat   Bulbocavernosus   Mild tenderness bilat   Ischiocavernosus  Mild tenderness bilat   Deep Transverse Perineal  Mild tenderness bialt   Compress Urethra/Sphincter  WNL   Urethrovaginalis  Not tested   Pubococcygeus/Pubovaginalis  Tenderness Right and Left 3/10   Iliococcygeus  Tender Right; Pain Left 5/10   Coccygeus  Not tested   Piriformis (palpated externally)   Not tested   Obturator Internus   Tenderness Right 3/10          Assessment  Internal pelvic floor assessment completed and pt has tenderness of external and internal PFM bilat.  Worked on diaphragmatic breathing and instructed on HEP for PFM coordination.    Goals (to be met in 10 visits)       Not Met Progress Toward Partially Met Met   Patient will improve PERF score to at least 3/5/5/4 for improved pelvic floor function and pelvic organ support. [] [] [] []   Patient will demonstrate proper body mechanics for lifting and picking things up from the floor to decrease her LBP for activities with her baby [] [] [] []   Patient will report use of KNACK contraction at least 75% of the time to re-establish cough reflex and mitigate urinary leakage with increases in intra-abdominal pressure. [] [] [] []   Patient will report adherence to HEP for continued exercise benefits following cessation of PT. [] [] [] []           Plan  Continue work on diaphragmatic breathing for PFM coordination.    Treatment Last 4 Visits  Treatment Day: 2       6/2/2025 6/11/2025   Pelvic Treatment   Neuro Re-Education  Supine diaphragmatic breathing  Butterfly stretch with deep breathing  Cat/cow with deep breathing 10x   Therapeutic Activity Education provided on pelvic floor and pelvic organ anatomy and function (with use of pictures). Discussed changes that occur during pregnancy and post-partum period including but not limited to compromised cough reflex, diastasis recti, POP, and MONTEZ, as well as strategies for restoring normal PFM and core strength and function.    Education provided on MONTEZ symptoms. Discussed use of PFM strength and coordination training, as well as use of the KNACK contraction and improved pressure management strategies to mitigate symptoms.    Discussed common contributing factors to urge incontinence, as well as strategies for improving bladder control. Discussed  implementing distraction techniques and other urge deferment strategies, avoiding bladder irritants and optimizing water intake.  Issued urge deferment techniques handout. Reviewed purpose of internal pelvic floor assessment  Performed internal PFM assessment and discussed findings and POC  Pt education regarding inverse relationship of  diaphragm and PFM and education regarding core 4 muscles   Neuro Re-Educ Minutes  15   Therapeutic Activity Minutes 30 30   Evaluation Minutes 30    Total Time Of Timed Procedures 30 45   Total Time Of Service-Based Procedures 30 0   Total Treatment Time 60 45   HEP Increasing water intake, using knack maneuver and urge deferment techniques         HEP  Increasing water intake, using knack maneuver and urge deferment techniques    Charges  2 Ther act, 1 NMR

## 2025-06-18 ENCOUNTER — OFFICE VISIT (OUTPATIENT)
Dept: PHYSICAL THERAPY | Age: 35
End: 2025-06-18
Attending: STUDENT IN AN ORGANIZED HEALTH CARE EDUCATION/TRAINING PROGRAM
Payer: MEDICAID

## 2025-06-18 PROCEDURE — 97112 NEUROMUSCULAR REEDUCATION: CPT

## 2025-06-18 PROCEDURE — 97140 MANUAL THERAPY 1/> REGIONS: CPT

## 2025-06-18 NOTE — PROGRESS NOTES
Patient: Kailyn Vieyra (34 year old, female) Referring Provider:  Insurance:   Diagnosis: Stress incontinence (N39.3), LBP, Dyspareunia Marisa Murdock  MEDICAID   Date of Surgery: n/a Next MD visit:  N/A   Precautions:    No data recorded Referral Information:    Date of Evaluation: Req: 0, Auth: 0, Exp:     25 POC Auth Visits:  10        Today's Date   2025    Subjective  Pt reports that she is doing much better.  She has not had any urinary leakage this week.       Pain:  not reported     Objective  See treatment log below.          Assessment  Performed STM on  scar as pt is hesitant to try it herself.  Demostrated how she could use a silicone cup if she does not like to touch the scar.  Pt is very tender along length of scar and will benefit from scar tissue massage to the area.    Goals (to be met in 10 visits)      Not Met Progress Toward Partially Met Met   Patient will improve PERF score to at least 3/5/5/4 for improved pelvic floor function and pelvic organ support. [] [] [] []   Patient will demonstrate proper body mechanics for lifting and picking things up from the floor to decrease her LBP for activities with her baby [] [] [] []   Patient will report use of KNACK contraction at least 75% of the time to re-establish cough reflex and mitigate urinary leakage with increases in intra-abdominal pressure. [] [] [] []   Patient will report adherence to HEP for continued exercise benefits following cessation of PT. [] [] [] []               Plan  Continue work on diaphragmatic breathing for PFM coordination.    Treatment Last 4 Visits  Treatment Day: 3       2025   Pelvic Treatment   Neuro Re-Education  Supine diaphragmatic breathing  Butterfly stretch with deep breathing  Cat/cow with deep breathing 10x Supine diaphragmatic breathing 1min  Happy baby stretch with deep breathing 1 min  Butterfly stretch with deep breathing 1 min  Bent knee fall outs with  inhale/exhale 10x R/L   Bridges with inhale/exhale 10x  Cat/cow with deep breathing 10x   Child's pose with deep breathing 1 min  Quadruped alternating LE lifts with exhale 10x  Sit to stand with exhale 10x  Squats with inhale/exhale 10x   Manual Therapy   Direct and indirect CS scar massage  Dynamic cupping/MFR to CS scar   Therapeutic Activity Education provided on pelvic floor and pelvic organ anatomy and function (with use of pictures). Discussed changes that occur during pregnancy and post-partum period including but not limited to compromised cough reflex, diastasis recti, POP, and MONTEZ, as well as strategies for restoring normal PFM and core strength and function.    Education provided on MONTEZ symptoms. Discussed use of PFM strength and coordination training, as well as use of the KNACK contraction and improved pressure management strategies to mitigate symptoms.    Discussed common contributing factors to urge incontinence, as well as strategies for improving bladder control. Discussed  implementing distraction techniques and other urge deferment strategies, avoiding bladder irritants and optimizing water intake.  Issued urge deferment techniques handout. Reviewed purpose of internal pelvic floor assessment  Performed internal PFM assessment and discussed findings and POC  Pt education regarding inverse relationship of diaphragm and PFM and education regarding core 4 muscles    Neuro Re-Educ Minutes  15 28   Manual Therapy Minutes   12   Therapeutic Activity Minutes 30 30    Evaluation Minutes 30     Total Time Of Timed Procedures 30 45 40   Total Time Of Service-Based Procedures 30 0 0   Total Treatment Time 60 45 40   HEP Increasing water intake, using knack maneuver and urge deferment techniques  Access Code: [email or web address]  URL: [email or web address]  Date: 06/18/2025  Prepared by: Taisha Booth    Exercises  - Supine Pelvic Floor Stretch  - 1 x daily - 7 x weekly - 1 minute hold  -  Diaphragmatic Breathing in Child's Pose with Pelvic Floor Relaxation  - 1 x daily - 7 x weekly - 1 minute hold  - Supine Bridge  - 1 x daily - 7 x weekly - 1 sets - 10 reps        HEP  Access Code: [email or web address]  URL: [email or web address]  Date: 06/18/2025  Prepared by: Taisha Booth    Exercises  - Supine Pelvic Floor Stretch  - 1 x daily - 7 x weekly - 1 minute hold  - Diaphragmatic Breathing in Child's Pose with Pelvic Floor Relaxation  - 1 x daily - 7 x weekly - 1 minute hold  - Supine Bridge  - 1 x daily - 7 x weekly - 1 sets - 10 reps    Charges  1 Man, 2 NMR

## 2025-06-25 ENCOUNTER — OFFICE VISIT (OUTPATIENT)
Dept: PHYSICAL THERAPY | Age: 35
End: 2025-06-25
Attending: STUDENT IN AN ORGANIZED HEALTH CARE EDUCATION/TRAINING PROGRAM
Payer: MEDICAID

## 2025-06-25 PROCEDURE — 97140 MANUAL THERAPY 1/> REGIONS: CPT

## 2025-06-25 PROCEDURE — 97112 NEUROMUSCULAR REEDUCATION: CPT

## 2025-06-25 NOTE — PROGRESS NOTES
Patient: Kailyn Vieyra (34 year old, female) Referring Provider:  Insurance:   Diagnosis: Stress incontinence (N39.3), LBP, Dyspareunia Marisa [facility]xton [place] MEDICAID   Date of Surgery: n/a Next MD visit:  N/A   Precautions:    No data recorded Referral Information:    Date of Evaluation: Req: 0, Auth: 0, Exp:     06/02/25 POC Auth Visits:  10        Today's Date   6/25/2025    Subjective  Pt reports that she is doing much better than when she started PT.  She thinks that she had some food poisoning and was vomiting and she leaked at that time.  Otherwise, she only had very small leakage with a cough.  She is no longer wearing pads.  Her low back is also feeling better.       Pain: 3/10     Objective  See treatment log below.         Assessment  Worked more on squatting and lifting mechanics with breathing and pt demonstrated good understanding.  MONTEZ symptoms are improving.    Goals (to be met in 10 visits)      Not Met Progress Toward Partially Met Met   Patient will improve PERF score to at least 3/5/5/4 for improved pelvic floor function and pelvic organ support. [] [] [] []   Patient will demonstrate proper body mechanics for lifting and picking things up from the floor to decrease her LBP for activities with her baby [] [] [] []   Patient will report use of KNACK contraction at least 75% of the time to re-establish cough reflex and mitigate urinary leakage with increases in intra-abdominal pressure. [] [] [] []   Patient will report adherence to HEP for continued exercise benefits following cessation of PT. [] [] [] []                   Plan  Continue work on diaphragmatic breathing for PFM coordination. Reassess internal PFM next visit.    Treatment Last 4 Visits  Treatment Day: 4       6/2/2025 6/11/2025 6/18/2025 6/25/2025   Pelvic Treatment   Neuro Re-Education  Supine diaphragmatic breathing  Butterfly stretch with deep breathing  Cat/cow with deep breathing 10x Supine diaphragmatic breathing 1min  Happy  baby stretch with deep breathing 1 min  Butterfly stretch with deep breathing 1 min  Bent knee fall outs with inhale/exhale 10x R/L   Bridges with inhale/exhale 10x  Cat/cow with deep breathing 10x   Child's pose with deep breathing 1 min  Quadruped alternating LE lifts with exhale 10x  Sit to stand with exhale 10x  Squats with inhale/exhale 10x Supine diaphragmatic breathing 1min   Happy baby stretch with deep breathing 1 min   Butterfly stretch with deep breathing 1 min   Bent knee fall outs with inhale/exhale 10x R/L   Supine march with deep breathing 10x   Bridges with inhale/exhale 10x   Cat/cow with deep breathing 10x   Child's pose with deep breathing 1 min   Quadruped alternating LE lifts with exhale 10x   Sit to stand with exhale 10x   Squats with inhale/exhale 10x   Box lifts from floor to chest with inhale/exhale (small box) 8x     Manual Therapy   Direct and indirect CS scar massage  Dynamic cupping/MFR to CS scar Direct and indirect CS scar massage   Dynamic cupping/MFR to CS scar      Therapeutic Activity Education provided on pelvic floor and pelvic organ anatomy and function (with use of pictures). Discussed changes that occur during pregnancy and post-partum period including but not limited to compromised cough reflex, diastasis recti, POP, and MONTEZ, as well as strategies for restoring normal PFM and core strength and function.    Education provided on MONTEZ symptoms. Discussed use of PFM strength and coordination training, as well as use of the KNACK contraction and improved pressure management strategies to mitigate symptoms.    Discussed common contributing factors to urge incontinence, as well as strategies for improving bladder control. Discussed  implementing distraction techniques and other urge deferment strategies, avoiding bladder irritants and optimizing water intake.  Issued urge deferment techniques handout. Reviewed purpose of internal pelvic floor assessment  Performed internal PFM  assessment and discussed findings and POC  Pt education regarding inverse relationship of diaphragm and PFM and education regarding core 4 muscles     Neuro Re-Educ Minutes  15 28 28   Manual Therapy Minutes   12 12   Therapeutic Activity Minutes 30 30     Evaluation Minutes 30      Total Time Of Timed Procedures 30 45 40 40   Total Time Of Service-Based Procedures 30 0 0 0   Total Treatment Time 60 45 40 40   HEP Increasing water intake, using knack maneuver and urge deferment techniques  Access Code: [email or web address]  URL: [email or web address]/  Date: 06/18/2025  Prepared by: Taisha Booth    Exercises  - Supine Pelvic Floor Stretch  - 1 x daily - 7 x weekly - 1 minute hold  - Diaphragmatic Breathing in Child's Pose with Pelvic Floor Relaxation  - 1 x daily - 7 x weekly - 1 minute hold  - Supine Bridge  - 1 x daily - 7 x weekly - 1 sets - 10 reps         HEP  Access Code: [email or web address]  URL: [email or web address]/  Date: 06/18/2025  Prepared by: Taisha Booth    Exercises  - Supine Pelvic Floor Stretch  - 1 x daily - 7 x weekly - 1 minute hold  - Diaphragmatic Breathing in Child's Pose with Pelvic Floor Relaxation  - 1 x daily - 7 x weekly - 1 minute hold  - Supine Bridge  - 1 x daily - 7 x weekly - 1 sets - 10 reps    Charges  1 Man, 2 NMR

## 2025-07-03 ENCOUNTER — OFFICE VISIT (OUTPATIENT)
Dept: PHYSICAL THERAPY | Age: 35
End: 2025-07-03
Attending: STUDENT IN AN ORGANIZED HEALTH CARE EDUCATION/TRAINING PROGRAM
Payer: MEDICAID

## 2025-07-03 PROCEDURE — 97140 MANUAL THERAPY 1/> REGIONS: CPT

## 2025-07-03 PROCEDURE — 97112 NEUROMUSCULAR REEDUCATION: CPT

## 2025-07-03 NOTE — PROGRESS NOTES
Patient: Kailyn Vieyra (34 year old, female) Referring Provider:  Insurance:   Diagnosis: Stress incontinence (N39.3), LBP, Dyspareunia Marisa Mezaley  MEDICAID   Date of Surgery: n/a Next MD visit:  N/A   Precautions:    No data recorded Referral Information:    Date of Evaluation: Req: 0, Auth: 0, Exp:     06/02/25 POC Auth Visits:  10        Today's Date   7/3/2025    Subjective  Pt states that she had an episode the other day that she was very busy and ignored the urge to go to the bathroom and then probably about 4 hours later she finally was going to the bathroom and could not hold it.  Also has had a little leakage with coughing.       Pain:  not reported     Objective  See treatment log below.  Verbal consent given for internal pelvic floor treatment.         Assessment  Discussed importance of urinary frequency of ideally  2.5- 4 hours.  Pt continues to be tender at bilat levator ani L>R.  Suggested purchase of pelvic wand for home use.    Goals (to be met in 10 visits)      Not Met Progress Toward Partially Met Met   Patient will improve PERF score to at least 3/5/5/4 for improved pelvic floor function and pelvic organ support. [] [] [] []   Patient will demonstrate proper body mechanics for lifting and picking things up from the floor to decrease her LBP for activities with her baby [] [] [] []   Patient will report use of KNACK contraction at least 75% of the time to re-establish cough reflex and mitigate urinary leakage with increases in intra-abdominal pressure. [] [] [] []   Patient will report adherence to HEP for continued exercise benefits following cessation of PT. [] [] [] []                       Plan  Continue work on diaphragmatic breathing for PFM coordination and manual treatment to scar tissue and internal PFM.    Treatment Last 4 Visits  Treatment Day: 5       6/11/2025 6/18/2025 6/25/2025 7/3/2025   Pelvic Treatment   Neuro Re-Education Supine diaphragmatic breathing  Butterfly  stretch with deep breathing  Cat/cow with deep breathing 10x Supine diaphragmatic breathing 1min  Happy baby stretch with deep breathing 1 min  Butterfly stretch with deep breathing 1 min  Bent knee fall outs with inhale/exhale 10x R/L   Bridges with inhale/exhale 10x  Cat/cow with deep breathing 10x   Child's pose with deep breathing 1 min  Quadruped alternating LE lifts with exhale 10x  Sit to stand with exhale 10x  Squats with inhale/exhale 10x Supine diaphragmatic breathing 1min   Happy baby stretch with deep breathing 1 min   Butterfly stretch with deep breathing 1 min   Bent knee fall outs with inhale/exhale 10x R/L   Supine march with deep breathing 10x   Bridges with inhale/exhale 10x   Cat/cow with deep breathing 10x   Child's pose with deep breathing 1 min   Quadruped alternating LE lifts with exhale 10x   Sit to stand with exhale 10x   Squats with inhale/exhale 10x   Box lifts from floor to chest with inhale/exhale (small box) 8x   Happy baby stretch with deep breathing 1 min   Butterfly stretch with deep breathing 1 min   Bent knee fall outs with inhale/exhale 10x R/L   Supine march with deep breathing 10x   Bridges with inhale/exhale 10x          Manual Therapy  Direct and indirect CS scar massage  Dynamic cupping/MFR to CS scar Direct and indirect CS scar massage   Dynamic cupping/MFR to CS scar    MFR to bilat levator ani internally; contract/relax x3 at iliococcygeus bilat   Therapeutic Activity Reviewed purpose of internal pelvic floor assessment  Performed internal PFM assessment and discussed findings and POC  Pt education regarding inverse relationship of diaphragm and PFM and education regarding core 4 muscles      Neuro Re-Educ Minutes 15 28 28 25   Manual Therapy Minutes  12 12 20   Therapeutic Activity Minutes 30      Total Time Of Timed Procedures 45 40 40 45   Total Time Of Service-Based Procedures 0 0 0 0   Total Treatment Time 45 40 40 45   HEP  Access Code: [email or web address]  URL:  [email or web address]/  Date: 06/18/2025  Prepared by: Taisha Booth    Exercises  - Supine Pelvic Floor Stretch  - 1 x daily - 7 x weekly - 1 minute hold  - Diaphragmatic Breathing in Child's Pose with Pelvic Floor Relaxation  - 1 x daily - 7 x weekly - 1 minute hold  - Supine Bridge  - 1 x daily - 7 x weekly - 1 sets - 10 reps          HEP  Access Code: [email or web address]  URL: [email or web address]  Date: 06/18/2025  Prepared by: Taisha Booth    Exercises  - Supine Pelvic Floor Stretch  - 1 x daily - 7 x weekly - 1 minute hold  - Diaphragmatic Breathing in Child's Pose with Pelvic Floor Relaxation  - 1 x daily - 7 x weekly - 1 minute hold  - Supine Bridge  - 1 x daily - 7 x weekly - 1 sets - 10 reps    Charges  2 NMR, 1 Man

## 2025-07-10 ENCOUNTER — OFFICE VISIT (OUTPATIENT)
Dept: PHYSICAL THERAPY | Age: 35
End: 2025-07-10
Attending: STUDENT IN AN ORGANIZED HEALTH CARE EDUCATION/TRAINING PROGRAM
Payer: MEDICAID

## 2025-07-10 PROCEDURE — 97140 MANUAL THERAPY 1/> REGIONS: CPT

## 2025-07-10 NOTE — PROGRESS NOTES
Patient: Kailyn Vieyra (34 year old, female) Referring Provider:  Insurance:   Diagnosis: Stress incontinence (N39.3), LBP, Dyspareunia Marisa Mezaley  MEDICAID   Date of Surgery: n/a Next MD visit:  N/A   Precautions:    No data recorded Referral Information:    Date of Evaluation: Req: 0, Auth: 0, Exp:     06/02/25 POC Auth Visits:  10        Today's Date   7/10/2025    Subjective  Pt reports that she has been doing really well this week.  It has been a very stressful week but she has been doing her deep breathing which helps.       Pain:       Objective  Verbal consent given for internal pelvic floor treatment.          Assessment  Focused on manual treatment today.  Less tighness of bilat levator ani but pt starts out with tenderness and improves with muscle releases.    Goals (to be met in 10 visits)      Not Met Progress Toward Partially Met Met   Patient will improve PERF score to at least 3/5/5/4 for improved pelvic floor function and pelvic organ support. [] [] [] []   Patient will demonstrate proper body mechanics for lifting and picking things up from the floor to decrease her LBP for activities with her baby [] [] [] []   Patient will report use of KNACK contraction at least 75% of the time to re-establish cough reflex and mitigate urinary leakage with increases in intra-abdominal pressure. [] [] [] []   Patient will report adherence to HEP for continued exercise benefits following cessation of PT. [] [] [] []                           Plan  Continue work on diaphragmatic breathing for PFM coordination and manual treatment to scar tissue and internal PFM.    Treatment Last 4 Visits  Treatment Day: 6       6/18/2025 6/25/2025 7/3/2025 7/10/2025   Pelvic Treatment   Neuro Re-Education Supine diaphragmatic breathing 1min  Happy baby stretch with deep breathing 1 min  Butterfly stretch with deep breathing 1 min  Bent knee fall outs with inhale/exhale 10x R/L   Bridges with inhale/exhale 10x  Cat/cow  with deep breathing 10x   Child's pose with deep breathing 1 min  Quadruped alternating LE lifts with exhale 10x  Sit to stand with exhale 10x  Squats with inhale/exhale 10x Supine diaphragmatic breathing 1min   Happy baby stretch with deep breathing 1 min   Butterfly stretch with deep breathing 1 min   Bent knee fall outs with inhale/exhale 10x R/L   Supine march with deep breathing 10x   Bridges with inhale/exhale 10x   Cat/cow with deep breathing 10x   Child's pose with deep breathing 1 min   Quadruped alternating LE lifts with exhale 10x   Sit to stand with exhale 10x   Squats with inhale/exhale 10x   Box lifts from floor to chest with inhale/exhale (small box) 8x   Happy baby stretch with deep breathing 1 min   Butterfly stretch with deep breathing 1 min   Bent knee fall outs with inhale/exhale 10x R/L   Supine march with deep breathing 10x   Bridges with inhale/exhale 10x           Manual Therapy Direct and indirect CS scar massage  Dynamic cupping/MFR to CS scar Direct and indirect CS scar massage   Dynamic cupping/MFR to CS scar    MFR to bilat levator ani internally; contract/relax x3 at iliococcygeus bilat MFR to bilat levator ani internally  MRF to CS scar   Neuro Re-Educ Minutes 28 28 25    Manual Therapy Minutes 12 12 20 40   Total Time Of Timed Procedures 40 40 45 40   Total Time Of Service-Based Procedures 0 0 0 0   Total Treatment Time 40 40 45 40   HEP Access Code: [email or web address]  URL: [email or web address]  Date: 06/18/2025  Prepared by: Taisha Booth    Exercises  - Supine Pelvic Floor Stretch  - 1 x daily - 7 x weekly - 1 minute hold  - Diaphragmatic Breathing in Child's Pose with Pelvic Floor Relaxation  - 1 x daily - 7 x weekly - 1 minute hold  - Supine Bridge  - 1 x daily - 7 x weekly - 1 sets - 10 reps           HEP  Access Code: [email or web address]  URL: [email or web address]://[email or web address]/  Date: 06/18/2025  Prepared by: Taisha Booth    Exercises  - Supine Pelvic Floor Stretch  -  1 x daily - 7 x weekly - 1 minute hold  - Diaphragmatic Breathing in Child's Pose with Pelvic Floor Relaxation  - 1 x daily - 7 x weekly - 1 minute hold  - Supine Bridge  - 1 x daily - 7 x weekly - 1 sets - 10 reps    Charges  3 Man

## 2025-07-17 ENCOUNTER — OFFICE VISIT (OUTPATIENT)
Dept: PHYSICAL THERAPY | Age: 35
End: 2025-07-17
Attending: STUDENT IN AN ORGANIZED HEALTH CARE EDUCATION/TRAINING PROGRAM
Payer: MEDICAID

## 2025-07-17 PROCEDURE — 97140 MANUAL THERAPY 1/> REGIONS: CPT

## 2025-07-17 NOTE — PROGRESS NOTES
Patient: Kailyn Vieyra (34 year old, female) Referring Provider:  Insurance:   Diagnosis: Stress incontinence (N39.3), LBP, Dyspareunia Marisa Mezaley  MEDICAID   Date of Surgery: n/a Next MD visit:  N/A   Precautions:    No data recorded Referral Information:    Date of Evaluation: Req: 0, Auth: 0, Exp:     25 POC Auth Visits:  10        Today's Date   2025    Subjective  Pt states that she did not have any urinary leakage during her trip out of town.  She has not been having back pain since using a lumbar roll.  Manual treatments seem to be helping and she is having less pain with sex.       Pain:       Objective  Verbal consent given for internal pelvic floor treatment.            Assessment  Continued manual treatments to  scar and levator ani.  Pt  at PFM but reports improvement overall.    Goals (to be met in 10 visits)      Not Met Progress Toward Partially Met Met   Patient will improve PERF score to at least 3/5/5/4 for improved pelvic floor function and pelvic organ support. [] [] [] []   Patient will demonstrate proper body mechanics for lifting and picking things up from the floor to decrease her LBP for activities with her baby [] [] [] []   Patient will report use of KNACK contraction at least 75% of the time to re-establish cough reflex and mitigate urinary leakage with increases in intra-abdominal pressure. [] [] [] []   Patient will report adherence to HEP for continued exercise benefits following cessation of PT. [] [] [] []                               Plan  Continue work on diaphragmatic breathing for PFM coordination and manual treatment to scar tissue and internal PFM.    Treatment Last 4 Visits  Treatment Day: 7       2025 7/3/2025 7/10/2025 2025   Pelvic Treatment   Neuro Re-Education Supine diaphragmatic breathing 1min   Happy baby stretch with deep breathing 1 min   Butterfly stretch with deep breathing 1 min   Bent knee fall outs with  inhale/exhale 10x R/L   Supine march with deep breathing 10x   Bridges with inhale/exhale 10x   Cat/cow with deep breathing 10x   Child's pose with deep breathing 1 min   Quadruped alternating LE lifts with exhale 10x   Sit to stand with exhale 10x   Squats with inhale/exhale 10x   Box lifts from floor to chest with inhale/exhale (small box) 8x   Happy baby stretch with deep breathing 1 min   Butterfly stretch with deep breathing 1 min   Bent knee fall outs with inhale/exhale 10x R/L   Supine march with deep breathing 10x   Bridges with inhale/exhale 10x         --   Manual Therapy Direct and indirect CS scar massage   Dynamic cupping/MFR to CS scar    MFR to bilat levator ani internally; contract/relax x3 at iliococcygeus bilat MFR to bilat levator ani internally  MRF to CS scar MFR to bilat levator ani internally   MFR to CS scar      Neuro Re-Educ Minutes 28 25     Manual Therapy Minutes 12 20 40 40   Total Time Of Timed Procedures 40 45 40 40   Total Time Of Service-Based Procedures 0 0 0 0   Total Treatment Time 40 45 40 40        HEP  Access Code: [email or web address]  URL: [email or web address]  Date: 06/18/2025  Prepared by: Taisha Booth    Exercises  - Supine Pelvic Floor Stretch  - 1 x daily - 7 x weekly - 1 minute hold  - Diaphragmatic Breathing in Child's Pose with Pelvic Floor Relaxation  - 1 x daily - 7 x weekly - 1 minute hold  - Supine Bridge  - 1 x daily - 7 x weekly - 1 sets - 10 reps    Charges  3 Man

## 2025-07-24 ENCOUNTER — OFFICE VISIT (OUTPATIENT)
Dept: PHYSICAL THERAPY | Age: 35
End: 2025-07-24
Attending: STUDENT IN AN ORGANIZED HEALTH CARE EDUCATION/TRAINING PROGRAM
Payer: MEDICAID

## 2025-07-24 PROCEDURE — 97140 MANUAL THERAPY 1/> REGIONS: CPT

## 2025-07-24 NOTE — PROGRESS NOTES
Patient: Kailyn Vieyra (34 year old, female) Referring Provider:  Insurance:   Diagnosis: Stress incontinence (N39.3), LBP, Dyspareunia Marisa Mezaley  MEDICAID   Date of Surgery: n/a Next MD visit:  N/A   Precautions:    No data recorded Referral Information:    Date of Evaluation: Req: 0, Auth: 0, Exp:     06/02/25 POC Auth Visits:  10        Today's Date   7/24/2025    Subjective  Pt reports that she has not had any leakages this week.       Pain:       Objective  Verbal consent given for internal pelvic floor treatment.         Assessment  Pt still with mild tenderness at bilat levator ani but overall much improved and having less pain with intercourse.  Will try going to a [employer] park with her son this week so will see if she has any leakage with jumping.    Goals (to be met in 10 visits)      Not Met Progress Toward Partially Met Met   Patient will improve PERF score to at least 3/5/5/4 for improved pelvic floor function and pelvic organ support. [] [] [] []   Patient will demonstrate proper body mechanics for lifting and picking things up from the floor to decrease her LBP for activities with her baby [] [] [] []   Patient will report use of KNACK contraction at least 75% of the time to re-establish cough reflex and mitigate urinary leakage with increases in intra-abdominal pressure. [] [] [] []   Patient will report adherence to HEP for continued exercise benefits following cessation of PT. [] [] [] []                                   Plan  Continue work on diaphragmatic breathing for PFM coordination and manual treatment to scar tissue and internal PFM.    Treatment Last 4 Visits  Treatment Day: 8       7/3/2025 7/10/2025 7/17/2025 7/24/2025   Pelvic Treatment   Neuro Re-Education Happy baby stretch with deep breathing 1 min   Butterfly stretch with deep breathing 1 min   Bent knee fall outs with inhale/exhale 10x R/L   Supine march with deep breathing 10x   Bridges with inhale/exhale 10x          --    Manual Therapy MFR to bilat levator ani internally; contract/relax x3 at iliococcygeus bilat MFR to bilat levator ani internally  MRF to CS scar MFR to bilat levator ani internally   MFR to CS scar    MFR to bilat levator ani internally   MFR to CS scar      Neuro Re-Educ Minutes 25      Manual Therapy Minutes 20 40 40 40   Total Time Of Timed Procedures 45 40 40 40   Total Time Of Service-Based Procedures 0 0 0 0   Total Treatment Time 45 40 40 40        HEP  Access Code: [email or web address]  URL: [email or web address]  Date: 06/18/2025  Prepared by: Taisha Booth    Exercises  - Supine Pelvic Floor Stretch  - 1 x daily - 7 x weekly - 1 minute hold  - Diaphragmatic Breathing in Child's Pose with Pelvic Floor Relaxation  - 1 x daily - 7 x weekly - 1 minute hold  - Supine Bridge  - 1 x daily - 7 x weekly - 1 sets - 10 reps    Charges  3 Man

## 2025-07-31 ENCOUNTER — APPOINTMENT (OUTPATIENT)
Dept: PHYSICAL THERAPY | Age: 35
End: 2025-07-31
Attending: STUDENT IN AN ORGANIZED HEALTH CARE EDUCATION/TRAINING PROGRAM
Payer: MEDICAID

## 2025-08-07 ENCOUNTER — APPOINTMENT (OUTPATIENT)
Dept: PHYSICAL THERAPY | Age: 35
End: 2025-08-07
Attending: STUDENT IN AN ORGANIZED HEALTH CARE EDUCATION/TRAINING PROGRAM

## 2025-08-14 ENCOUNTER — APPOINTMENT (OUTPATIENT)
Dept: PHYSICAL THERAPY | Age: 35
End: 2025-08-14
Attending: STUDENT IN AN ORGANIZED HEALTH CARE EDUCATION/TRAINING PROGRAM

## 2025-08-21 ENCOUNTER — OFFICE VISIT (OUTPATIENT)
Dept: PHYSICAL THERAPY | Age: 35
End: 2025-08-21
Attending: STUDENT IN AN ORGANIZED HEALTH CARE EDUCATION/TRAINING PROGRAM

## 2025-08-21 PROCEDURE — 97112 NEUROMUSCULAR REEDUCATION: CPT

## 2025-08-21 PROCEDURE — 97140 MANUAL THERAPY 1/> REGIONS: CPT

## 2025-08-27 ENCOUNTER — TELEPHONE (OUTPATIENT)
Dept: PHYSICAL THERAPY | Facility: HOSPITAL | Age: 35
End: 2025-08-27

## 2025-08-28 ENCOUNTER — APPOINTMENT (OUTPATIENT)
Dept: PHYSICAL THERAPY | Age: 35
End: 2025-08-28
Attending: STUDENT IN AN ORGANIZED HEALTH CARE EDUCATION/TRAINING PROGRAM

## 2025-08-29 ENCOUNTER — OFFICE VISIT (OUTPATIENT)
Dept: PHYSICAL THERAPY | Age: 35
End: 2025-08-29
Attending: STUDENT IN AN ORGANIZED HEALTH CARE EDUCATION/TRAINING PROGRAM

## 2025-08-29 PROCEDURE — 97112 NEUROMUSCULAR REEDUCATION: CPT

## (undated) DEVICE — GAMMEX® PI HYBRID SIZE 8, STERILE POWDER-FREE SURGICAL GLOVE, POLYISOPRENE AND NEOPRENE BLEND: Brand: GAMMEX

## (undated) DEVICE — SUCTION CANISTER, 3000CC,SAFELINER: Brand: DEROYAL

## (undated) DEVICE — PACKING 470404 MEROCEL 2000 10PK 8CM: Brand: MEROCEL®

## (undated) DEVICE — REFLEX ULTRA 45 WITH INTEGRATED CABLE: Brand: COBLATION

## (undated) DEVICE — PAD,EYE,LARGE,2 1/8"X2 5/8",STERILE,LF: Brand: MEDLINE

## (undated) DEVICE — SUT PLN GUT 5-0 18IN PC-1 ABSRB TAN YELLOWISH

## (undated) DEVICE — APPLICATOR,COTTON-TIP,WOOD,3,STRL: Brand: MEDLINE

## (undated) DEVICE — SOLUTION IRRIG 1000ML 0.9% NACL USP BTL

## (undated) DEVICE — PACK CUSTOM NASAL ACCESSORY

## (undated) DEVICE — NEEDLE BLNT 18GA L1.5IN FILL DISP PRECISGLDE

## (undated) DEVICE — 1 ML INSULIN SYRINGE LUER-LOCK WITH TIP CAP: Brand: MONOJECT

## (undated) DEVICE — HEAD AND NECK: Brand: MEDLINE INDUSTRIES, INC.

## (undated) DEVICE — SKIN PREP TRAY 4 COMPARTM TRAY: Brand: MEDLINE INDUSTRIES, INC.

## (undated) DEVICE — SUT PLN GUT 4-0 18IN SC-1 ABSRB TAN YELLOWISH

## (undated) NOTE — LETTER
Kailyn Vieyra, :1990    CONSENT FOR PROCEDURE/SEDATION    1. I authorize the performance upon aKilyn Vieyra  the following: Insertion Intrauterine Device    2. I authorize Dr. Zunilda Valdivia MD (and whomever is designated as the doctor’s assistant), to perform the above-mentioned procedures.    3. If any unforeseen conditions arise during this procedure calling for additional  procedures, operations, or medications (including anesthesia and blood transfusion), I further request and authorize the doctor to do whatever he/she deems advisable in my interest.    4. I consent to the taking and reproduction of any photographs in the course of this procedure for professional purposes.    5. I consent to the administration of such sedation as may be considered necessary or advisable by the physician responsible for this service, with the exception of ______________________________________________________    6. I have been informed by my doctor of the nature and purpose of this procedure sedation, possible alternative methods of treatment, risk involved and possible complications.      Signature of Patient:_______________________________________________    Signature of person authorized to consent for patient:  _______________________________________________________________    Relationship to patient: ____________________________________________    Witness: _________________________________________ Date:___________     Physician Signature: _______________________________ Date:___________

## (undated) NOTE — LETTER
[facility]  Administration of Anesthesia  Kailyn MARIE agree to be cared for by a physician anesthesiologist alone and/or with a nurse anesthetist, who is specially trained to monitor me and give me medicine to put me to sleep or keep me comfortable during my procedure    I understand that my anesthesiologist and/or anesthetist is not an employee or agent of [facility] or [employer]. He or she works for [facility]    As the patient asking for anesthesia services, I agree to:  Allow the anesthesiologist (anesthesia doctor) to give me medicine and do additional procedures as necessary. Some examples are: Starting or using an “IV” to give me medicine, fluids or blood during my procedure, and having a breathing tube placed to help me breathe when I’m asleep (intubation). In the event that my heart stops working properly, I understand that my anesthesiologist will make every effort to sustain my life, unless otherwise directed by [facility] Do Not Resuscitate documents.  Tell my anesthesia doctor before my procedure:  If I am pregnant.  The last time that I ate or drank.  iii. All of the medicines I take (including prescriptions, herbal supplements, and pills I can buy without a prescription (including street drugs/illegal medications). Failure to inform my anesthesiologist about these medicines may increase my risk of anesthetic complications.  iv.If I am allergic to anything or have had a reaction to anesthesia before.  I understand how the anesthesia medicine will help me (benefits).  I understand that with any type of anesthesia medicine there are risks:  The most common risks are: nausea, vomiting, sore throat, muscle soreness, damage to my eyes, mouth, or teeth (from breathing tube placement).  Rare risks include: remembering what happened during my procedure, allergic reactions to medications, injury to my airway, heart, lungs, vision, nerves, or  muscles and in extremely rare instances death.  My doctor has explained to me other choices available to me for my care (alternatives).  Pregnant Patients (“epidural”):  I understand that the risks of having an epidural (medicine given into my back to help control pain during labor), include itching, low blood pressure, difficulty urinating, headache or slowing of the baby’s heart. Very rare risks include infection, bleeding, seizure, irregular heart rhythms and nerve injury.  Regional Anesthesia (“spinal”, “epidural”, & “nerve blocks”):  I understand that rare but potential complications include headache, bleeding, infection, seizure, irregular heart rhythms, and nerve injury.    _____________________________________________________________________________  Patient (or Representative) Signature/Relationship to Patient  Date   Time    _____________________________________________________________________________   Name (if used)    Language/Organization   Time    _____________________________________________________________________________  Nurse Anesthetist Signature     Date   Time  _____________________________________________________________________________  Anesthesiologist Signature     Date   Time  I have discussed the procedure and information above with the patient (or patient’s representative) and answered their questions. The patient or their representative has agreed to have anesthesia services.    _____________________________________________________________________________  Witness        Date   Time  I have verified that the signature is that of the patient or patient’s representative, and that it was signed before the procedure  Patient Name: Kailyn Vieyra     : 1990                 Printed: 10/16/2024 at 12:19 PM    Medical Record #: [id number]                                            Page 1 of 1  ----------ANESTHESIA CONSENT----------

## (undated) NOTE — LETTER
AUTHORIZATION FOR SURGICAL OPERATION OR OTHER PROCEDURE    1. I hereby authorize David ANAYA, and [address] [place] [address] staff assigned to my case to perform the following operation and/or procedure at the [address] [place] [address]:    IUD REMOVAL ____________________________________________________________________________      _______________________________________________________________________________________________    2. My physician has explained the nature and purpose of the operation or other procedure, possible alternative methods of treatment, the risks involved, and the possibility of complication to me. I acknowledge that no guarantee has been made as to the result that may be obtained. 3.  I recognize that, during the course of this operation, or other procedure, unforseen conditions may necessitate additional or different procedure than those listed above. I, therefore, further authorize and request that the above named physician, his/her physician assistants or designees perform such procedures as are, in his/her professional opinion, necessary and desirable. 4.  Any tissue or organs removed in the operation or other procedure may be disposed of by and at the discretion of the [address] [place] [address] and [facility]. 5.  I understand that in the event of a medical emergency, I will be transported by local paramedics to [facility] or other hospital emergency department. 6.  I certify that I have read and fully understand the above consent to operation and/or other procedure. 7.  I acknowledge that my physician has explained sedation/analgesia administration to me including the risks and benefits. I consent to the administration of sedation/analgesia as may be necessary or desirable in the judgement of my physician. Witness signature: ___________________________________________________ Date:  ______/______/_____                    Time:  ________ A. M.  P.M. Patient Name:  ______________________________________________________  (please print)      Patient signature:  ___________________________________________________             Relationship to Patient:           []  Parent    Responsible person                          []  Spouse  In case of minor or                    [] Other  _____________   Incompetent name:  __________________________________________________                               (please print)      _____________      Responsible person  In case of minor or  Incompetent signature:  _______________________________________________    Statement of Physician  My signature below affirms that prior to the time of the procedure, I have explained to the patient and/or his/her guardian, the risks and benefits involved in the proposed treatment and any reasonable alternative to the proposed treatment. I have also explained the risks and benefits involved in the refusal of the proposed treatment and have answered the patient's questions.                         Date:  ______/______/_______  Provider                      Signature:  __________________________________________________________       Time:  ___________ A.M    P.M.